# Patient Record
Sex: MALE | Race: WHITE | NOT HISPANIC OR LATINO | URBAN - METROPOLITAN AREA
[De-identification: names, ages, dates, MRNs, and addresses within clinical notes are randomized per-mention and may not be internally consistent; named-entity substitution may affect disease eponyms.]

---

## 2018-05-17 ENCOUNTER — TRANSCRIBE ORDERS (OUTPATIENT)
Dept: ADMINISTRATIVE | Facility: HOSPITAL | Age: 58
End: 2018-05-17

## 2018-05-17 DIAGNOSIS — R79.89 ELEVATED LFTS: Primary | ICD-10-CM

## 2025-01-04 ENCOUNTER — APPOINTMENT (OUTPATIENT)
Dept: GENERAL RADIOLOGY | Facility: HOSPITAL | Age: 65
DRG: 287 | End: 2025-01-04
Payer: COMMERCIAL

## 2025-01-04 ENCOUNTER — APPOINTMENT (OUTPATIENT)
Facility: HOSPITAL | Age: 65
DRG: 287 | End: 2025-01-04
Payer: COMMERCIAL

## 2025-01-04 ENCOUNTER — HOSPITAL ENCOUNTER (INPATIENT)
Facility: HOSPITAL | Age: 65
LOS: 1 days | Discharge: HOME | DRG: 287 | End: 2025-01-05
Attending: EMERGENCY MEDICINE | Admitting: INTERNAL MEDICINE
Payer: COMMERCIAL

## 2025-01-04 DIAGNOSIS — I21.4 NSTEMI (NON-ST ELEVATED MYOCARDIAL INFARCTION): Primary | ICD-10-CM

## 2025-01-04 LAB
ALBUMIN SERPL-MCNC: 4.6 G/DL (ref 3.5–5.2)
ALBUMIN/GLOB SERPL: 1.5 G/DL
ALP SERPL-CCNC: 72 U/L (ref 39–117)
ALT SERPL W P-5'-P-CCNC: 33 U/L (ref 1–41)
ANION GAP SERPL CALCULATED.3IONS-SCNC: 19.7 MMOL/L (ref 5–15)
APTT PPP: 25.2 SECONDS (ref 60–90)
APTT PPP: 35.7 SECONDS (ref 60–90)
AST SERPL-CCNC: 30 U/L (ref 1–40)
BASOPHILS # BLD AUTO: 0.02 10*3/MM3 (ref 0–0.2)
BASOPHILS NFR BLD AUTO: 0.1 % (ref 0–1.5)
BILIRUB SERPL-MCNC: 0.7 MG/DL (ref 0–1.2)
BUN SERPL-MCNC: 23 MG/DL (ref 8–23)
BUN/CREAT SERPL: 21.1 (ref 7–25)
CALCIUM SPEC-SCNC: 10.7 MG/DL (ref 8.6–10.5)
CHLORIDE SERPL-SCNC: 101 MMOL/L (ref 98–107)
CO2 SERPL-SCNC: 19.3 MMOL/L (ref 22–29)
CREAT SERPL-MCNC: 1.09 MG/DL (ref 0.76–1.27)
D-LACTATE SERPL-SCNC: 1.8 MMOL/L (ref 0.5–2)
D-LACTATE SERPL-SCNC: 2.6 MMOL/L (ref 0.5–2)
D-LACTATE SERPL-SCNC: 3.8 MMOL/L (ref 0.5–2)
DEPRECATED RDW RBC AUTO: 38.4 FL (ref 37–54)
EGFRCR SERPLBLD CKD-EPI 2021: 76.7 ML/MIN/1.73
EOSINOPHIL # BLD AUTO: 0.1 10*3/MM3 (ref 0–0.4)
EOSINOPHIL NFR BLD AUTO: 0.7 % (ref 0.3–6.2)
ERYTHROCYTE [DISTWIDTH] IN BLOOD BY AUTOMATED COUNT: 11.8 % (ref 12.3–15.4)
ETHANOL BLD-MCNC: <10 MG/DL (ref 0–10)
FLUAV RNA RESP QL NAA+PROBE: NOT DETECTED
FLUBV RNA RESP QL NAA+PROBE: NOT DETECTED
GEN 5 1HR TROPONIN T REFLEX: 56 NG/L
GLOBULIN UR ELPH-MCNC: 3 GM/DL
GLUCOSE SERPL-MCNC: 183 MG/DL (ref 65–99)
HCT VFR BLD AUTO: 37.1 % (ref 37.5–51)
HGB BLD-MCNC: 13.3 G/DL (ref 13–17.7)
HOLD SPECIMEN: NORMAL
IMM GRANULOCYTES # BLD AUTO: 0.03 10*3/MM3 (ref 0–0.05)
IMM GRANULOCYTES NFR BLD AUTO: 0.2 % (ref 0–0.5)
INR PPP: 0.93 (ref 0.89–1.12)
LIPASE SERPL-CCNC: 18 U/L (ref 13–60)
LYMPHOCYTES # BLD AUTO: 0.55 10*3/MM3 (ref 0.7–3.1)
LYMPHOCYTES NFR BLD AUTO: 3.6 % (ref 19.6–45.3)
MAGNESIUM SERPL-MCNC: 1.5 MG/DL (ref 1.6–2.4)
MCH RBC QN AUTO: 31.3 PG (ref 26.6–33)
MCHC RBC AUTO-ENTMCNC: 35.8 G/DL (ref 31.5–35.7)
MCV RBC AUTO: 87.3 FL (ref 79–97)
MONOCYTES # BLD AUTO: 0.35 10*3/MM3 (ref 0.1–0.9)
MONOCYTES NFR BLD AUTO: 2.3 % (ref 5–12)
NEUTROPHILS NFR BLD AUTO: 14.28 10*3/MM3 (ref 1.7–7)
NEUTROPHILS NFR BLD AUTO: 93.1 % (ref 42.7–76)
NT-PROBNP SERPL-MCNC: 1039 PG/ML (ref 0–900)
PLATELET # BLD AUTO: 413 10*3/MM3 (ref 140–450)
PMV BLD AUTO: 8.6 FL (ref 6–12)
POTASSIUM SERPL-SCNC: 3.7 MMOL/L (ref 3.5–5.2)
PROT SERPL-MCNC: 7.6 G/DL (ref 6–8.5)
PROTHROMBIN TIME: 13 SECONDS (ref 12.2–14.5)
RBC # BLD AUTO: 4.25 10*6/MM3 (ref 4.14–5.8)
RSV RNA RESP QL NAA+PROBE: NOT DETECTED
SARS-COV-2 RNA RESP QL NAA+PROBE: NOT DETECTED
SODIUM SERPL-SCNC: 140 MMOL/L (ref 136–145)
TROPONIN T % DELTA: 4 %
TROPONIN T NUMERIC DELTA: 2 NG/L
TROPONIN T SERPL HS-MCNC: 54 NG/L
TROPONIN T SERPL HS-MCNC: 71 NG/L
UFH PPP CHRO-ACNC: 0.1 IU/ML (ref 0.3–0.7)
WBC NRBC COR # BLD AUTO: 15.33 10*3/MM3 (ref 3.4–10.8)
WHOLE BLOOD HOLD COAG: NORMAL
WHOLE BLOOD HOLD SPECIMEN: NORMAL

## 2025-01-04 PROCEDURE — 85025 COMPLETE CBC W/AUTO DIFF WBC: CPT | Performed by: EMERGENCY MEDICINE

## 2025-01-04 PROCEDURE — 25810000003 SODIUM CHLORIDE 0.9 % SOLUTION

## 2025-01-04 PROCEDURE — 99291 CRITICAL CARE FIRST HOUR: CPT

## 2025-01-04 PROCEDURE — 93005 ELECTROCARDIOGRAM TRACING: CPT | Performed by: EMERGENCY MEDICINE

## 2025-01-04 PROCEDURE — 25010000002 HEPARIN (PORCINE) PER 1000 UNITS

## 2025-01-04 PROCEDURE — 25010000002 MAGNESIUM SULFATE 2 GM/50ML SOLUTION

## 2025-01-04 PROCEDURE — 83880 ASSAY OF NATRIURETIC PEPTIDE: CPT | Performed by: EMERGENCY MEDICINE

## 2025-01-04 PROCEDURE — 25010000002 THIAMINE HCL 200 MG/2ML SOLUTION: Performed by: HOSPITALIST

## 2025-01-04 PROCEDURE — 25510000001 IOPAMIDOL PER 1 ML: Performed by: EMERGENCY MEDICINE

## 2025-01-04 PROCEDURE — 25010000002 MORPHINE PER 10 MG: Performed by: EMERGENCY MEDICINE

## 2025-01-04 PROCEDURE — 25010000002 ONDANSETRON PER 1 MG: Performed by: EMERGENCY MEDICINE

## 2025-01-04 PROCEDURE — 85520 HEPARIN ASSAY: CPT

## 2025-01-04 PROCEDURE — 84484 ASSAY OF TROPONIN QUANT: CPT | Performed by: HOSPITALIST

## 2025-01-04 PROCEDURE — 84484 ASSAY OF TROPONIN QUANT: CPT | Performed by: EMERGENCY MEDICINE

## 2025-01-04 PROCEDURE — 85730 THROMBOPLASTIN TIME PARTIAL: CPT

## 2025-01-04 PROCEDURE — 25010000002 NITROGLYCERIN 200 MCG/ML SOLUTION

## 2025-01-04 PROCEDURE — 99223 1ST HOSP IP/OBS HIGH 75: CPT | Performed by: HOSPITALIST

## 2025-01-04 PROCEDURE — 85610 PROTHROMBIN TIME: CPT

## 2025-01-04 PROCEDURE — 36415 COLL VENOUS BLD VENIPUNCTURE: CPT

## 2025-01-04 PROCEDURE — 71275 CT ANGIOGRAPHY CHEST: CPT

## 2025-01-04 PROCEDURE — 83605 ASSAY OF LACTIC ACID: CPT

## 2025-01-04 PROCEDURE — 83735 ASSAY OF MAGNESIUM: CPT

## 2025-01-04 PROCEDURE — 25010000002 MAGNESIUM SULFATE 2 GM/50ML SOLUTION: Performed by: HOSPITALIST

## 2025-01-04 PROCEDURE — 87637 SARSCOV2&INF A&B&RSV AMP PRB: CPT

## 2025-01-04 PROCEDURE — 25010000002 HEPARIN (PORCINE) 25000-0.45 UT/250ML-% SOLUTION

## 2025-01-04 PROCEDURE — 25010000002 ONDANSETRON PER 1 MG

## 2025-01-04 PROCEDURE — 82077 ASSAY SPEC XCP UR&BREATH IA: CPT

## 2025-01-04 PROCEDURE — 83690 ASSAY OF LIPASE: CPT | Performed by: EMERGENCY MEDICINE

## 2025-01-04 PROCEDURE — 80053 COMPREHEN METABOLIC PANEL: CPT | Performed by: EMERGENCY MEDICINE

## 2025-01-04 RX ORDER — CLOPIDOGREL BISULFATE 75 MG/1
300 TABLET ORAL ONCE
Status: COMPLETED | OUTPATIENT
Start: 2025-01-04 | End: 2025-01-04

## 2025-01-04 RX ORDER — ROSUVASTATIN CALCIUM 20 MG/1
40 TABLET, COATED ORAL DAILY
Status: DISCONTINUED | OUTPATIENT
Start: 2025-01-05 | End: 2025-01-05 | Stop reason: HOSPADM

## 2025-01-04 RX ORDER — MAGNESIUM SULFATE HEPTAHYDRATE 40 MG/ML
2 INJECTION, SOLUTION INTRAVENOUS
Status: CANCELLED | OUTPATIENT
Start: 2025-01-04 | End: 2025-01-05

## 2025-01-04 RX ORDER — ASPIRIN 81 MG/1
81 TABLET ORAL DAILY
Status: DISCONTINUED | OUTPATIENT
Start: 2025-01-05 | End: 2025-01-05 | Stop reason: HOSPADM

## 2025-01-04 RX ORDER — LISINOPRIL 30 MG/1
30 TABLET ORAL DAILY
COMMUNITY

## 2025-01-04 RX ORDER — NITROGLYCERIN 0.4 MG/1
0.4 TABLET SUBLINGUAL
Status: DISCONTINUED | OUTPATIENT
Start: 2025-01-04 | End: 2025-01-05 | Stop reason: HOSPADM

## 2025-01-04 RX ORDER — SODIUM CHLORIDE 9 MG/ML
40 INJECTION, SOLUTION INTRAVENOUS AS NEEDED
Status: DISCONTINUED | OUTPATIENT
Start: 2025-01-04 | End: 2025-01-05 | Stop reason: HOSPADM

## 2025-01-04 RX ORDER — POLYETHYLENE GLYCOL 3350 17 G/17G
17 POWDER, FOR SOLUTION ORAL DAILY PRN
Status: DISCONTINUED | OUTPATIENT
Start: 2025-01-04 | End: 2025-01-05 | Stop reason: HOSPADM

## 2025-01-04 RX ORDER — HEPARIN SODIUM 1000 [USP'U]/ML
4000 INJECTION, SOLUTION INTRAVENOUS; SUBCUTANEOUS AS NEEDED
Status: DISCONTINUED | OUTPATIENT
Start: 2025-01-04 | End: 2025-01-04

## 2025-01-04 RX ORDER — DIAZEPAM 5 MG/1
20 TABLET ORAL
Status: DISCONTINUED | OUTPATIENT
Start: 2025-01-04 | End: 2025-01-05 | Stop reason: HOSPADM

## 2025-01-04 RX ORDER — AMOXICILLIN 250 MG
2 CAPSULE ORAL 2 TIMES DAILY PRN
Status: DISCONTINUED | OUTPATIENT
Start: 2025-01-04 | End: 2025-01-05 | Stop reason: HOSPADM

## 2025-01-04 RX ORDER — ONDANSETRON 2 MG/ML
4 INJECTION INTRAMUSCULAR; INTRAVENOUS ONCE
Status: DISCONTINUED | OUTPATIENT
Start: 2025-01-04 | End: 2025-01-04

## 2025-01-04 RX ORDER — MAGNESIUM SULFATE HEPTAHYDRATE 40 MG/ML
2 INJECTION, SOLUTION INTRAVENOUS ONCE
Status: COMPLETED | OUTPATIENT
Start: 2025-01-04 | End: 2025-01-04

## 2025-01-04 RX ORDER — DIAZEPAM 10 MG/2ML
10 INJECTION, SOLUTION INTRAMUSCULAR; INTRAVENOUS
Status: DISCONTINUED | OUTPATIENT
Start: 2025-01-04 | End: 2025-01-05 | Stop reason: HOSPADM

## 2025-01-04 RX ORDER — ACETAMINOPHEN 325 MG/1
650 TABLET ORAL EVERY 4 HOURS PRN
Status: DISCONTINUED | OUTPATIENT
Start: 2025-01-04 | End: 2025-01-05 | Stop reason: HOSPADM

## 2025-01-04 RX ORDER — DIAZEPAM 5 MG/1
15 TABLET ORAL
Status: DISCONTINUED | OUTPATIENT
Start: 2025-01-04 | End: 2025-01-05 | Stop reason: HOSPADM

## 2025-01-04 RX ORDER — METOPROLOL TARTRATE 25 MG/1
25 TABLET, FILM COATED ORAL ONCE
Status: COMPLETED | OUTPATIENT
Start: 2025-01-04 | End: 2025-01-04

## 2025-01-04 RX ORDER — IOPAMIDOL 755 MG/ML
100 INJECTION, SOLUTION INTRAVASCULAR
Status: COMPLETED | OUTPATIENT
Start: 2025-01-04 | End: 2025-01-04

## 2025-01-04 RX ORDER — MORPHINE SULFATE 2 MG/ML
2 INJECTION, SOLUTION INTRAMUSCULAR; INTRAVENOUS EVERY 4 HOURS PRN
Status: DISCONTINUED | OUTPATIENT
Start: 2025-01-04 | End: 2025-01-05

## 2025-01-04 RX ORDER — HEPARIN SODIUM 1000 [USP'U]/ML
4000 INJECTION, SOLUTION INTRAVENOUS; SUBCUTANEOUS ONCE
Status: COMPLETED | OUTPATIENT
Start: 2025-01-04 | End: 2025-01-04

## 2025-01-04 RX ORDER — DIAZEPAM 10 MG/2ML
15 INJECTION, SOLUTION INTRAMUSCULAR; INTRAVENOUS
Status: DISCONTINUED | OUTPATIENT
Start: 2025-01-04 | End: 2025-01-05 | Stop reason: HOSPADM

## 2025-01-04 RX ORDER — METOPROLOL TARTRATE 1 MG/ML
5 INJECTION, SOLUTION INTRAVENOUS ONCE
Status: COMPLETED | OUTPATIENT
Start: 2025-01-04 | End: 2025-01-04

## 2025-01-04 RX ORDER — HEPARIN SODIUM 1000 [USP'U]/ML
2000 INJECTION, SOLUTION INTRAVENOUS; SUBCUTANEOUS AS NEEDED
Status: DISCONTINUED | OUTPATIENT
Start: 2025-01-04 | End: 2025-01-04

## 2025-01-04 RX ORDER — CLOPIDOGREL BISULFATE 75 MG/1
75 TABLET ORAL DAILY
Status: DISCONTINUED | OUTPATIENT
Start: 2025-01-05 | End: 2025-01-05 | Stop reason: HOSPADM

## 2025-01-04 RX ORDER — FOLIC ACID 1 MG/1
1 TABLET ORAL DAILY
Status: DISCONTINUED | OUTPATIENT
Start: 2025-01-04 | End: 2025-01-05 | Stop reason: HOSPADM

## 2025-01-04 RX ORDER — ASPIRIN 81 MG/1
324 TABLET, CHEWABLE ORAL ONCE
Status: COMPLETED | OUTPATIENT
Start: 2025-01-04 | End: 2025-01-04

## 2025-01-04 RX ORDER — ONDANSETRON 2 MG/ML
4 INJECTION INTRAMUSCULAR; INTRAVENOUS ONCE
Status: COMPLETED | OUTPATIENT
Start: 2025-01-04 | End: 2025-01-04

## 2025-01-04 RX ORDER — PANTOPRAZOLE SODIUM 40 MG/10ML
80 INJECTION, POWDER, LYOPHILIZED, FOR SOLUTION INTRAVENOUS ONCE
Status: COMPLETED | OUTPATIENT
Start: 2025-01-04 | End: 2025-01-04

## 2025-01-04 RX ORDER — DIAZEPAM 5 MG/1
10 TABLET ORAL
Status: DISCONTINUED | OUTPATIENT
Start: 2025-01-04 | End: 2025-01-05 | Stop reason: HOSPADM

## 2025-01-04 RX ORDER — MULTIPLE VITAMINS W/ MINERALS TAB 9MG-400MCG
1 TAB ORAL DAILY
Status: DISCONTINUED | OUTPATIENT
Start: 2025-01-04 | End: 2025-01-05 | Stop reason: HOSPADM

## 2025-01-04 RX ORDER — NITROGLYCERIN 20 MG/100ML
5-200 INJECTION INTRAVENOUS
Status: DISCONTINUED | OUTPATIENT
Start: 2025-01-04 | End: 2025-01-05

## 2025-01-04 RX ORDER — THIAMINE HYDROCHLORIDE 100 MG/ML
200 INJECTION, SOLUTION INTRAMUSCULAR; INTRAVENOUS EVERY 8 HOURS SCHEDULED
Status: DISCONTINUED | OUTPATIENT
Start: 2025-01-04 | End: 2025-01-05 | Stop reason: HOSPADM

## 2025-01-04 RX ORDER — BISACODYL 10 MG
10 SUPPOSITORY, RECTAL RECTAL DAILY PRN
Status: DISCONTINUED | OUTPATIENT
Start: 2025-01-04 | End: 2025-01-05 | Stop reason: HOSPADM

## 2025-01-04 RX ORDER — ROSUVASTATIN CALCIUM 40 MG/1
40 TABLET, COATED ORAL DAILY
COMMUNITY

## 2025-01-04 RX ORDER — BISACODYL 5 MG/1
5 TABLET, DELAYED RELEASE ORAL DAILY PRN
Status: DISCONTINUED | OUTPATIENT
Start: 2025-01-04 | End: 2025-01-05 | Stop reason: HOSPADM

## 2025-01-04 RX ORDER — HEPARIN SODIUM 10000 [USP'U]/100ML
13 INJECTION, SOLUTION INTRAVENOUS
Status: DISCONTINUED | OUTPATIENT
Start: 2025-01-04 | End: 2025-01-05 | Stop reason: HOSPADM

## 2025-01-04 RX ORDER — SODIUM CHLORIDE 0.9 % (FLUSH) 0.9 %
10 SYRINGE (ML) INJECTION AS NEEDED
Status: DISCONTINUED | OUTPATIENT
Start: 2025-01-04 | End: 2025-01-05 | Stop reason: HOSPADM

## 2025-01-04 RX ORDER — DIAZEPAM 10 MG/2ML
20 INJECTION, SOLUTION INTRAMUSCULAR; INTRAVENOUS
Status: DISCONTINUED | OUTPATIENT
Start: 2025-01-04 | End: 2025-01-05 | Stop reason: HOSPADM

## 2025-01-04 RX ORDER — MAGNESIUM SULFATE HEPTAHYDRATE 40 MG/ML
2 INJECTION, SOLUTION INTRAVENOUS
Status: DISPENSED | OUTPATIENT
Start: 2025-01-04 | End: 2025-01-05

## 2025-01-04 RX ORDER — SODIUM CHLORIDE 0.9 % (FLUSH) 0.9 %
10 SYRINGE (ML) INJECTION EVERY 12 HOURS SCHEDULED
Status: DISCONTINUED | OUTPATIENT
Start: 2025-01-04 | End: 2025-01-05 | Stop reason: HOSPADM

## 2025-01-04 RX ADMIN — NITROGLYCERIN 0.4 MG: 0.4 TABLET SUBLINGUAL at 14:36

## 2025-01-04 RX ADMIN — MAGNESIUM SULFATE HEPTAHYDRATE 2 G: 40 INJECTION, SOLUTION INTRAVENOUS at 15:41

## 2025-01-04 RX ADMIN — IOPAMIDOL 64 ML: 755 INJECTION, SOLUTION INTRAVENOUS at 14:49

## 2025-01-04 RX ADMIN — FOLIC ACID 1 MG: 1 TABLET ORAL at 21:38

## 2025-01-04 RX ADMIN — METOPROLOL TARTRATE 25 MG: 25 TABLET, FILM COATED ORAL at 16:18

## 2025-01-04 RX ADMIN — MORPHINE SULFATE 4 MG: 4 INJECTION, SOLUTION INTRAMUSCULAR; INTRAVENOUS at 15:53

## 2025-01-04 RX ADMIN — HEPARIN SODIUM 10 UNITS/KG/HR: 10000 INJECTION, SOLUTION INTRAVENOUS at 15:28

## 2025-01-04 RX ADMIN — IOPAMIDOL 85 ML: 755 INJECTION, SOLUTION INTRAVENOUS at 14:53

## 2025-01-04 RX ADMIN — NITROGLYCERIN 5 MCG/MIN: 20 INJECTION INTRAVENOUS at 19:00

## 2025-01-04 RX ADMIN — THIAMINE HYDROCHLORIDE 200 MG: 100 INJECTION, SOLUTION INTRAMUSCULAR; INTRAVENOUS at 21:39

## 2025-01-04 RX ADMIN — SODIUM CHLORIDE 500 ML: 9 INJECTION, SOLUTION INTRAVENOUS at 15:41

## 2025-01-04 RX ADMIN — NITROGLYCERIN 0.4 MG: 0.4 TABLET SUBLINGUAL at 14:30

## 2025-01-04 RX ADMIN — ONDANSETRON 4 MG: 2 INJECTION INTRAMUSCULAR; INTRAVENOUS at 15:55

## 2025-01-04 RX ADMIN — HEPARIN SODIUM 4000 UNITS: 1000 INJECTION INTRAVENOUS; SUBCUTANEOUS at 15:28

## 2025-01-04 RX ADMIN — ASPIRIN 81 MG 324 MG: 81 TABLET ORAL at 14:18

## 2025-01-04 RX ADMIN — NITROGLYCERIN 10 MCG/MIN: 20 INJECTION INTRAVENOUS at 15:21

## 2025-01-04 RX ADMIN — CLOPIDOGREL BISULFATE 300 MG: 75 TABLET ORAL at 16:18

## 2025-01-04 RX ADMIN — PANTOPRAZOLE SODIUM 80 MG: 40 INJECTION, POWDER, FOR SOLUTION INTRAVENOUS at 16:13

## 2025-01-04 RX ADMIN — METOPROLOL TARTRATE 5 MG: 5 INJECTION INTRAVENOUS at 16:15

## 2025-01-04 RX ADMIN — MORPHINE SULFATE 4 MG: 4 INJECTION, SOLUTION INTRAMUSCULAR; INTRAVENOUS at 17:26

## 2025-01-04 RX ADMIN — Medication 10 ML: at 21:39

## 2025-01-04 RX ADMIN — Medication 1 TABLET: at 21:39

## 2025-01-04 RX ADMIN — ONDANSETRON 4 MG: 2 INJECTION INTRAMUSCULAR; INTRAVENOUS at 18:08

## 2025-01-04 RX ADMIN — MAGNESIUM SULFATE HEPTAHYDRATE 2 G: 40 INJECTION, SOLUTION INTRAVENOUS at 23:00

## 2025-01-04 RX ADMIN — HEPARIN SODIUM 4000 UNITS: 1000 INJECTION INTRAVENOUS; SUBCUTANEOUS at 22:26

## 2025-01-04 RX ADMIN — NITROGLYCERIN 0.4 MG: 0.4 TABLET SUBLINGUAL at 14:43

## 2025-01-04 NOTE — Clinical Note
Level of Care: Telemetry [5]   Accepting Provider:: BHAVIN CARDOZO [[id number]]   Patient Class: Inpatient [101]

## 2025-01-04 NOTE — FSED PROVIDER NOTE
"CHIEF COMPLAINT  Chest Pain     HISTORY OF PRESENT ILLNESS:  Dany Rice is a 64 y.o. male who presents with midsternal chest pain/pressure that he describes as sharp pressure.  He has a history of hypertension and hypercholesterolemia.  Also reports that he had some indigestion around midnight last night and took some Tums and went to sleep.  Around 3 AM he woke up with nausea and vomiting x 3 with maybe 2 or 3 loose bowel movements.  This continued till 7 AM when the chest pain began.  He also reports that he drinks 2-3 beers per day as well as 2 shots of bourbon.  Last drink was at 9 PM last night.  He does not smoke tobacco products but does smoke marijuana occasionally.  No other illicit drug uses.  Family history of cardiac disease.  Has never had a cardiac workup and does not have a cardiologist      PAST MEDICAL HISTORY  Past Medical History:   Diagnosis Date    Hyperlipidemia     Hypertension      Reviewed the imported nursing notes    PAST SURGICAL HISTORY  History reviewed. No pertinent surgical history.  Reviewed the imported nursing notes    ALLERGIES  Allergies   Allergen Reactions    Penicillins Nausea Only       MEDICATIONS       Medication List        ASK your doctor about these medications      HYDROcodone-acetaminophen 5-325 MG per tablet  Commonly known as: NORCO  Take 1 tablet by mouth Every 6 (Six) Hours as Needed for Pain            SOCIAL HISTORY       The patient otherwise denies any further social history.  Reviewed the imported nursing notes      FAMILY HISTORY  History reviewed. No pertinent family history.  The patient otherwise patient denies any further family history.  Reviewed the imported nursing notes      REVIEW OF SYSTEMS  Reviewed and negative/not pertinent unless mentioned in the HPI        PHYSICAL EXAM  Vitals: /80   Pulse 97   Temp 99.6 °F (37.6 °C) (Axillary)   Resp 24   Ht 182.9 cm (72\")   Wt 97.5 kg (215 lb)   SpO2 100%   BMI 29.16 kg/m²      General " Appearance: Awake and Alert, cooperative, in mild distress and flushed   Head:  Normocephalic, atraumatic   Eyes: Conjunctiva clear, corneas clear   Ears:  Normal external ears,   Nose: Nares normal and clear   Throat: Lips, mucosa moist   Neck:  Trachea midline, no stridor   Lungs:  Tachypneic, clear to auscultation bilaterally, respirations unlabored   Heart: Regular, No rub   Abdomen: Nondistended, non tender   Genitourinary:  Pelvic:  Rectal: Not Performed  Not Performed  Not Performed   Extremities: Extremities Atraumatic   Vascular: Present in all extremities   Skin:  no rashes or lesions   Neurologic: Non Focal , CN 2-12 grossly intact, GCS 15   Psyc: Not Performed         MEDICAL DECISION MAKING - ED COURSE/PROCEDURE/DDX:    PULSE OX: Interpretation by me on room air Is normal  SpO2 Readings from Last 1 Encounters:   01/04/25 100%          CARDIAC MONITOR: Sinus tachycardia  Pulse Readings from Last 1 Encounters:   01/04/25 97            I reviewed the nursing notes: YES  I reviewed and discussed with EMS:   External documents reviewed:   Additional history taken from: Previous notes     Discussed with consulting physician hospitalist and cardiology additionally, the admitting / accepting provider was contacted with handoff      LAB REVIEWED: Interpretation of all unique test ordered  Labs Reviewed   TROPONIN - Abnormal; Notable for the following components:       Result Value    HS Troponin T 54 (*)     All other components within normal limits   COMPREHENSIVE METABOLIC PANEL - Abnormal; Notable for the following components:    Glucose 183 (*)     CO2 19.3 (*)     Calcium 10.7 (*)     Anion Gap 19.7 (*)     All other components within normal limits    Narrative:     GFR Categories in Chronic Kidney Disease (CKD)      GFR Category          GFR (mL/min/1.73)    Interpretation  G1                     90 or greater         Normal or high (1)  G2                      60-89                Mild decrease (1)  G3a                    45-59                Mild to moderate decrease  G3b                   30-44                Moderate to severe decrease  G4                    15-29                Severe decrease  G5                    14 or less           Kidney failure          (1)In the absence of evidence of kidney disease, neither GFR category G1 or G2 fulfill the criteria for CKD.    eGFR calculation 2021 CKD-EPI creatinine equation, which does not include race as a factor   BNP (IN-HOUSE) - Abnormal; Notable for the following components:    proBNP 1,039.0 (*)     All other components within normal limits    Narrative:     This assay is used as an aid in the diagnosis of individuals suspected of having heart failure. It can be used as an aid in the diagnosis of acute decompensated heart failure (ADHF) in patients presenting with signs and symptoms of ADHF to the emergency department (ED). In addition, NT-proBNP of <300 pg/mL indicates ADHF is not likely.    Age Range Result Interpretation  NT-proBNP Concentration (pg/mL:      <50             Positive            >450                   Gray                 300-450                    Negative             <300    50-75           Positive            >900                  Gray                300-900                  Negative            <300      >75             Positive            >1800                  Gray                300-1800                  Negative            <300   CBC WITH AUTO DIFFERENTIAL - Abnormal; Notable for the following components:    WBC 15.33 (*)     Hematocrit 37.1 (*)     MCHC 35.8 (*)     RDW 11.8 (*)     Neutrophil % 93.1 (*)     Lymphocyte % 3.6 (*)     Monocyte % 2.3 (*)     Neutrophils, Absolute 14.28 (*)     Lymphocytes, Absolute 0.55 (*)     All other components within normal limits   LACTIC ACID, PLASMA - Abnormal; Notable for the following components:    Lactate 3.8 (*)     All other components within normal limits   MAGNESIUM - Abnormal; Notable for  the following components:    Magnesium 1.5 (*)     All other components within normal limits   HIGH SENSITIVITIY TROPONIN T 1HR - Abnormal; Notable for the following components:    HS Troponin T 56 (*)     All other components within normal limits   APTT - Abnormal; Notable for the following components:    PTT 25.2 (*)     All other components within normal limits    Narrative:     PTT = The equivalent PTT values for the therapeutic range of heparin levels at 0.3 to 0.5 U/ml are 60 to 70 seconds.   COVID-19/FLUA&B/RSV, NP SWAB IN TRANSPORT MEDIA 1 HR TAT - Normal    Narrative:     Fact sheet for providers: https://www.fda.gov/media/061451/download    Fact sheet for patients: https://www.fda.gov/media/019306/download    Test performed by PCR.   LIPASE - Normal   ETHANOL - Normal    Narrative:     Elevated lactic acid concentration and lactate dehydrogenase(LD) activity may falsely elevate enzymatically determined ethanol levels. Not for legal purposes.    PROTIME-INR - Normal   RAINBOW DRAW    Narrative:     The following orders were created for panel order [place] Draw.  Procedure                               Abnormality         Status                     ---------                               -----------         ------                     Green Top (Gel)[[id number]]                                  Final result               Lavender Top[[id number]]                                     Final result               Gold Top - SST[[id number]]                                   Final result               Hubbard Top[[id number]]                                         Final result               Light Blue Top[[id number]]                                   Final result                 Please view results for these tests on the individual orders.   LACTIC ACID, REFLEX   HEPARIN ANTI XA   APTT   CBC AND DIFFERENTIAL    Narrative:     The following orders were created for panel order CBC & Differential.  Procedure                                Abnormality         Status                     ---------                               -----------         ------                     CBC Auto Differential[[id number]]        Abnormal            Final result                 Please view results for these tests on the individual orders.   GREEN TOP   LAVENDER TOP   GOLD TOP - SST   GRAY TOP   LIGHT BLUE TOP       IMAGING REVIEWED  My X-ray interpretation:   My CT interpretation: No evidence of a saddle pulmonary emboli or gross dissection  My Ultrasound interpretation:   CT Angiogram Chest Pulmonary Embolism   Final Result   Impression:   No evidence of pulmonary embolus.            Electronically Signed: Abel Ramos MD     1/4/2025 3:00 PM EST     Workstation ID: [id number]          Procedures:    Decision rules/scores:    HEART Score: 7        Drug therapy provided in the ED and monitored as needed given IV/PO :   Medications   sodium chloride 0.9 % flush 10 mL (has no administration in time range)   nitroglycerin (NITROSTAT) SL tablet 0.4 mg (0.4 mg Sublingual Given 1/4/25 1443)   nitroglycerin (TRIDIL) 200 mcg/ml infusion (40 mcg/min Intravenous Rate/Dose Change 1/4/25 1619)   heparin 89560 units/250 mL (100 units/mL) in 0.45 % NaCl infusion (10 Units/kg/hr × 97.5 kg Intravenous New Bag 1/4/25 1528)   Pharmacy to Dose Heparin (has no administration in time range)   magnesium sulfate 2g/50 mL (PREMIX) infusion (2 g Intravenous New Bag 1/4/25 1541)   aspirin chewable tablet 324 mg (324 mg Oral Given 1/4/25 1418)   iopamidol (ISOVUE-370) 76 % injection 100 mL (64 mL Intravenous Given 1/4/25 1449)   iopamidol (ISOVUE-370) 76 % injection 100 mL (85 mL Intravenous Given 1/4/25 1453)   heparin (porcine) injection 4,000 Units (4,000 Units Intravenous Given 1/4/25 1528)   ondansetron (ZOFRAN) injection 4 mg (4 mg Intravenous Given 1/4/25 1555)   sodium chloride 0.9 % bolus 500 mL (0 mL Intravenous Stopped 1/4/25 1620)   clopidogrel (PLAVIX) tablet 300 mg (300  mg Oral Given 1/4/25 1618)   morphine injection 4 mg (4 mg Intravenous Given 1/4/25 1553)   metoprolol tartrate (LOPRESSOR) injection 5 mg (5 mg Intravenous Given 1/4/25 1615)   metoprolol tartrate (LOPRESSOR) tablet 25 mg (25 mg Oral Given 1/4/25 1618)   pantoprazole (PROTONIX) injection 80 mg (80 mg Intravenous Given 1/4/25 1613)       Vitals Trend:  Vitals:    01/04/25 1534 01/04/25 1540 01/04/25 1544 01/04/25 1615   BP:  146/80 144/69 144/80   BP Location:       Patient Position:       Pulse:  110 111 97   Resp:       Temp: 99.6 °F (37.6 °C)      TempSrc: Axillary      SpO2:  100%     Weight:       Height:           Dany Rice is a 64 y.o. male who presents to the emergency department with the acute illness as stated within HPI  Shared medical decision making regarding a detailed discussion with the patient concerning this ED encounter, the differential diagnosis considered acute coronary syndrome, pulmonary emboli, dissection, GERD, ulcer, alcohol withdrawal, Prinzmetal's angina,, and the emergency room imaging and lab testing done today The patient and/or family have been given an opportunity to ask questions and exhibit an understanding of what happened today in the emergency room.        ED Course as of 01/04/25 1637   Sat Jan 04, 2025   1511 High Sensitivity Troponin T(!!) [NC]   1516 Patient appears to be having an NSTEMI.  His chest pain is improved from a 6 or 7 out of 10 down to a 5 out of 10 with the 3 sublingual nitroglycerin.  Will initiate a nitro drip and a heparin drip.  Consulting PCC for admission and cardiology input. [NC]   1517 Magnesium(!)  Will replete with 2 g IV [NC]   1517 Lactic Acid, Plasma(!!)  Fluids ordered [NC]   1546 Spoke with cardiology on-call, Dr. Davis,  agrees the patient's elderly he is having an NSTEMI and agrees to accept the patient for admission and transfer to [facility].  He is requesting a loading dose of Plavix and to administer metoprolol both IV and p.o.   Agrees with the heparin drip and nitro drip. [NC]      ED Course User Index  [NC] Júnior Curtis PA-C             Critical care time  45 minutes of critical care provided. This time excludes other billable procedures. Time does include preparation of documents, medical consultations, review of old records, and direct bedside care. Patient is at high risk for life-threatening deterioration due to acute coronary syndrome indicating emergent consultation with cardiology and placement on a nitroglycerin drip, heparin drip and frequent reevaluations and clinical monitoring of vital signs and patient's clinical status.             Condition considered emergent due to:  1) Acuity  2) Failure or unavailable treatment in the outpatient setting  3) Risk of deterioration and decompensation given the multiple differential diagnoses that  need to be ruled out      Amount and/or Complexity of Data Reviewed  Clinical lab tests: as above noted in MDM  Tests in the radiology section of CPT®: as above noted in MDM  Tests in the medicine section of CPT®: as above noted in MDM  Independent visualization of images, tracings, or specimens: as above noted in MDM        CLINICAL IMPRESSION:  Final diagnoses:   NSTEMI (non-ST elevated myocardial infarction)      DISPOSITION:  Admit      As with my usual standard practice patient was advised to return for any reason for any  complaint at any time whatsoever. Please refer to the discharge instructions, AVS paperwork  and prescriptions written for treatment plan.        Electronically signed by Júnior Curtis PA-C, 01/04/25, 2:10 PM EST.      This report was dictated utilizing a voice recognition system which has a propensity to miss  small words such as a, an, the, no, he,she,him, her,his and not. Please read this report carefully,  and if any discrepancy or uncertainty is present, please contact the author directly for  clarification

## 2025-01-05 ENCOUNTER — READMISSION MANAGEMENT (OUTPATIENT)
Dept: CALL CENTER | Facility: HOSPITAL | Age: 65
End: 2025-01-05
Payer: COMMERCIAL

## 2025-01-05 ENCOUNTER — APPOINTMENT (OUTPATIENT)
Dept: CARDIOLOGY | Facility: HOSPITAL | Age: 65
DRG: 287 | End: 2025-01-05
Payer: COMMERCIAL

## 2025-01-05 VITALS
SYSTOLIC BLOOD PRESSURE: 129 MMHG | HEIGHT: 72 IN | DIASTOLIC BLOOD PRESSURE: 76 MMHG | TEMPERATURE: 98.3 F | WEIGHT: 205.03 LBS | OXYGEN SATURATION: 96 % | RESPIRATION RATE: 16 BRPM | BODY MASS INDEX: 27.77 KG/M2 | HEART RATE: 91 BPM

## 2025-01-05 PROBLEM — F10.90 ALCOHOL USE: Status: ACTIVE | Noted: 2025-01-05

## 2025-01-05 PROBLEM — Z78.9 ALCOHOL USE: Status: ACTIVE | Noted: 2025-01-05

## 2025-01-05 PROBLEM — I10 ESSENTIAL HYPERTENSION: Status: ACTIVE | Noted: 2025-01-05

## 2025-01-05 PROBLEM — E78.5 HYPERLIPIDEMIA: Status: ACTIVE | Noted: 2025-01-05

## 2025-01-05 LAB
ANION GAP SERPL CALCULATED.3IONS-SCNC: 14 MMOL/L (ref 5–15)
AV MEAN PRESS GRAD SYS DOP V1V2: 7 MMHG
AV VMAX SYS DOP: 179 CM/SEC
BASOPHILS # BLD AUTO: 0.04 10*3/MM3 (ref 0–0.2)
BASOPHILS NFR BLD AUTO: 0.3 % (ref 0–1.5)
BH CV ECHO MEAS - AO MAX PG: 12.8 MMHG
BH CV ECHO MEAS - AO ROOT DIAM: 3.4 CM
BH CV ECHO MEAS - AO V2 VTI: 34.8 CM
BH CV ECHO MEAS - AVA(I,D): 2.8 CM2
BH CV ECHO MEAS - EDV(CUBED): 79.5 ML
BH CV ECHO MEAS - EDV(MOD-SP2): 63.9 ML
BH CV ECHO MEAS - EDV(MOD-SP4): 63.4 ML
BH CV ECHO MEAS - EF(MOD-SP2): 65.9 %
BH CV ECHO MEAS - EF(MOD-SP4): 63.4 %
BH CV ECHO MEAS - ESV(CUBED): 22 ML
BH CV ECHO MEAS - ESV(MOD-SP2): 21.8 ML
BH CV ECHO MEAS - ESV(MOD-SP4): 23.2 ML
BH CV ECHO MEAS - FS: 34.9 %
BH CV ECHO MEAS - IVS/LVPW: 0.9 CM
BH CV ECHO MEAS - IVSD: 0.9 CM
BH CV ECHO MEAS - LA DIMENSION: 3.8 CM
BH CV ECHO MEAS - LAT PEAK E' VEL: 9.6 CM/SEC
BH CV ECHO MEAS - LV DIASTOLIC VOL/BSA (35-75): 29.4 CM2
BH CV ECHO MEAS - LV MASS(C)D: 132.7 GRAMS
BH CV ECHO MEAS - LV MAX PG: 8.4 MMHG
BH CV ECHO MEAS - LV MEAN PG: 4.5 MMHG
BH CV ECHO MEAS - LV SYSTOLIC VOL/BSA (12-30): 10.8 CM2
BH CV ECHO MEAS - LV V1 MAX: 145 CM/SEC
BH CV ECHO MEAS - LV V1 VTI: 28.6 CM
BH CV ECHO MEAS - LVIDD: 4.3 CM
BH CV ECHO MEAS - LVIDS: 2.8 CM
BH CV ECHO MEAS - LVOT AREA: 3.5 CM2
BH CV ECHO MEAS - LVOT DIAM: 2.1 CM
BH CV ECHO MEAS - LVPWD: 1 CM
BH CV ECHO MEAS - MED PEAK E' VEL: 7 CM/SEC
BH CV ECHO MEAS - MV A MAX VEL: 84.4 CM/SEC
BH CV ECHO MEAS - MV DEC SLOPE: 490 CM/SEC2
BH CV ECHO MEAS - MV DEC TIME: 0.22 SEC
BH CV ECHO MEAS - MV E MAX VEL: 105 CM/SEC
BH CV ECHO MEAS - MV E/A: 1.24
BH CV ECHO MEAS - MV MAX PG: 8.3 MMHG
BH CV ECHO MEAS - MV MEAN PG: 3 MMHG
BH CV ECHO MEAS - MV P1/2T: 79.5 MSEC
BH CV ECHO MEAS - MV V2 VTI: 33.7 CM
BH CV ECHO MEAS - MVA(P1/2T): 2.8 CM2
BH CV ECHO MEAS - MVA(VTI): 2.9 CM2
BH CV ECHO MEAS - PA ACC TIME: 0.14 SEC
BH CV ECHO MEAS - SV(LVOT): 99.1 ML
BH CV ECHO MEAS - SV(MOD-SP2): 42.1 ML
BH CV ECHO MEAS - SV(MOD-SP4): 40.2 ML
BH CV ECHO MEAS - SVI(LVOT): 46 ML/M2
BH CV ECHO MEAS - SVI(MOD-SP2): 19.6 ML/M2
BH CV ECHO MEAS - SVI(MOD-SP4): 18.7 ML/M2
BH CV ECHO MEAS - TAPSE (>1.6): 3 CM
BH CV ECHO MEAS - TR MAX PG: 26.3 MMHG
BH CV ECHO MEAS - TR MAX VEL: 255 CM/SEC
BH CV ECHO MEASUREMENTS AVERAGE E/E' RATIO: 12.65
BH CV VAS BP RIGHT ARM: NORMAL MMHG
BH CV XLRA - RV BASE: 3.3 CM
BH CV XLRA - RV LENGTH: 6.5 CM
BH CV XLRA - RV MID: 3 CM
BH CV XLRA - TDI S': 11.9 CM/SEC
BUN SERPL-MCNC: 21 MG/DL (ref 8–23)
BUN/CREAT SERPL: 21.9 (ref 7–25)
CALCIUM SPEC-SCNC: 9.4 MG/DL (ref 8.6–10.5)
CHLORIDE SERPL-SCNC: 104 MMOL/L (ref 98–107)
CHOLEST SERPL-MCNC: 124 MG/DL (ref 0–200)
CO2 SERPL-SCNC: 22 MMOL/L (ref 22–29)
CREAT SERPL-MCNC: 0.96 MG/DL (ref 0.76–1.27)
DEPRECATED RDW RBC AUTO: 43.5 FL (ref 37–54)
EGFRCR SERPLBLD CKD-EPI 2021: 89.4 ML/MIN/1.73
EOSINOPHIL # BLD AUTO: 0 10*3/MM3 (ref 0–0.4)
EOSINOPHIL NFR BLD AUTO: 0 % (ref 0.3–6.2)
ERYTHROCYTE [DISTWIDTH] IN BLOOD BY AUTOMATED COUNT: 12.7 % (ref 12.3–15.4)
GLUCOSE SERPL-MCNC: 102 MG/DL (ref 65–99)
HBA1C MFR BLD: 5.5 % (ref 4.8–5.6)
HCT VFR BLD AUTO: 37 % (ref 37.5–51)
HDLC SERPL-MCNC: 61 MG/DL (ref 40–60)
HGB BLD-MCNC: 12.5 G/DL (ref 13–17.7)
IMM GRANULOCYTES # BLD AUTO: 0.07 10*3/MM3 (ref 0–0.05)
IMM GRANULOCYTES NFR BLD AUTO: 0.5 % (ref 0–0.5)
IVRT: 88 MS
LDLC SERPL CALC-MCNC: 47 MG/DL (ref 0–100)
LDLC/HDLC SERPL: 0.75 {RATIO}
LEFT ATRIUM VOLUME INDEX: 21.2 ML/M2
LV EF BIPLANE MOD: 63.1 %
LYMPHOCYTES # BLD AUTO: 1.91 10*3/MM3 (ref 0.7–3.1)
LYMPHOCYTES NFR BLD AUTO: 13.3 % (ref 19.6–45.3)
MAGNESIUM SERPL-MCNC: 2.7 MG/DL (ref 1.6–2.4)
MCH RBC QN AUTO: 31.4 PG (ref 26.6–33)
MCHC RBC AUTO-ENTMCNC: 33.8 G/DL (ref 31.5–35.7)
MCV RBC AUTO: 93 FL (ref 79–97)
MONOCYTES # BLD AUTO: 1.26 10*3/MM3 (ref 0.1–0.9)
MONOCYTES NFR BLD AUTO: 8.8 % (ref 5–12)
NEUTROPHILS NFR BLD AUTO: 11.04 10*3/MM3 (ref 1.7–7)
NEUTROPHILS NFR BLD AUTO: 77.1 % (ref 42.7–76)
NRBC BLD AUTO-RTO: 0 /100 WBC (ref 0–0.2)
PLATELET # BLD AUTO: 342 10*3/MM3 (ref 140–450)
PMV BLD AUTO: 8.6 FL (ref 6–12)
POTASSIUM SERPL-SCNC: 3.6 MMOL/L (ref 3.5–5.2)
QT INTERVAL: 384 MS
QTC INTERVAL: 448 MS
RBC # BLD AUTO: 3.98 10*6/MM3 (ref 4.14–5.8)
SODIUM SERPL-SCNC: 140 MMOL/L (ref 136–145)
TRIGL SERPL-MCNC: 85 MG/DL (ref 0–150)
TROPONIN T SERPL HS-MCNC: 58 NG/L
UFH PPP CHRO-ACNC: 0.34 IU/ML (ref 0.3–0.7)
VLDLC SERPL-MCNC: 16 MG/DL (ref 5–40)
WBC NRBC COR # BLD AUTO: 14.32 10*3/MM3 (ref 3.4–10.8)

## 2025-01-05 PROCEDURE — 93005 ELECTROCARDIOGRAM TRACING: CPT | Performed by: HOSPITALIST

## 2025-01-05 PROCEDURE — C1894 INTRO/SHEATH, NON-LASER: HCPCS | Performed by: INTERNAL MEDICINE

## 2025-01-05 PROCEDURE — 4A023N7 MEASUREMENT OF CARDIAC SAMPLING AND PRESSURE, LEFT HEART, PERCUTANEOUS APPROACH: ICD-10-PCS | Performed by: INTERNAL MEDICINE

## 2025-01-05 PROCEDURE — 93306 TTE W/DOPPLER COMPLETE: CPT | Performed by: INTERNAL MEDICINE

## 2025-01-05 PROCEDURE — 25010000002 NICARDIPINE 2.5 MG/ML SOLUTION: Performed by: INTERNAL MEDICINE

## 2025-01-05 PROCEDURE — B2151ZZ FLUOROSCOPY OF LEFT HEART USING LOW OSMOLAR CONTRAST: ICD-10-PCS | Performed by: INTERNAL MEDICINE

## 2025-01-05 PROCEDURE — 93458 L HRT ARTERY/VENTRICLE ANGIO: CPT | Performed by: INTERNAL MEDICINE

## 2025-01-05 PROCEDURE — 25010000002 MAGNESIUM SULFATE 2 GM/50ML SOLUTION: Performed by: HOSPITALIST

## 2025-01-05 PROCEDURE — 25010000002 LIDOCAINE PF 1% 1 % SOLUTION: Performed by: INTERNAL MEDICINE

## 2025-01-05 PROCEDURE — 85025 COMPLETE CBC W/AUTO DIFF WBC: CPT

## 2025-01-05 PROCEDURE — 25510000001 IOPAMIDOL PER 1 ML: Performed by: INTERNAL MEDICINE

## 2025-01-05 PROCEDURE — 80048 BASIC METABOLIC PNL TOTAL CA: CPT | Performed by: HOSPITALIST

## 2025-01-05 PROCEDURE — 84484 ASSAY OF TROPONIN QUANT: CPT | Performed by: HOSPITALIST

## 2025-01-05 PROCEDURE — 85520 HEPARIN ASSAY: CPT

## 2025-01-05 PROCEDURE — B2111ZZ FLUOROSCOPY OF MULTIPLE CORONARY ARTERIES USING LOW OSMOLAR CONTRAST: ICD-10-PCS | Performed by: INTERNAL MEDICINE

## 2025-01-05 PROCEDURE — C1769 GUIDE WIRE: HCPCS | Performed by: INTERNAL MEDICINE

## 2025-01-05 PROCEDURE — 80061 LIPID PANEL: CPT | Performed by: HOSPITALIST

## 2025-01-05 PROCEDURE — 83735 ASSAY OF MAGNESIUM: CPT | Performed by: HOSPITALIST

## 2025-01-05 PROCEDURE — 83036 HEMOGLOBIN GLYCOSYLATED A1C: CPT | Performed by: HOSPITALIST

## 2025-01-05 PROCEDURE — 25010000002 MIDAZOLAM PER 1 MG: Performed by: INTERNAL MEDICINE

## 2025-01-05 PROCEDURE — 25010000002 HEPARIN (PORCINE) PER 1000 UNITS: Performed by: INTERNAL MEDICINE

## 2025-01-05 PROCEDURE — 25810000003 SODIUM CHLORIDE 0.9 % SOLUTION: Performed by: INTERNAL MEDICINE

## 2025-01-05 PROCEDURE — 25010000002 THIAMINE HCL 200 MG/2ML SOLUTION: Performed by: HOSPITALIST

## 2025-01-05 PROCEDURE — 99239 HOSP IP/OBS DSCHRG MGMT >30: CPT | Performed by: INTERNAL MEDICINE

## 2025-01-05 PROCEDURE — 99222 1ST HOSP IP/OBS MODERATE 55: CPT | Performed by: INTERNAL MEDICINE

## 2025-01-05 PROCEDURE — 93306 TTE W/DOPPLER COMPLETE: CPT

## 2025-01-05 RX ORDER — LIDOCAINE HYDROCHLORIDE 10 MG/ML
INJECTION, SOLUTION EPIDURAL; INFILTRATION; INTRACAUDAL; PERINEURAL
Status: DISCONTINUED | OUTPATIENT
Start: 2025-01-05 | End: 2025-01-05 | Stop reason: HOSPADM

## 2025-01-05 RX ORDER — HEPARIN SODIUM 1000 [USP'U]/ML
INJECTION, SOLUTION INTRAVENOUS; SUBCUTANEOUS
Status: DISCONTINUED | OUTPATIENT
Start: 2025-01-05 | End: 2025-01-05 | Stop reason: HOSPADM

## 2025-01-05 RX ORDER — SODIUM CHLORIDE 9 MG/ML
100 INJECTION, SOLUTION INTRAVENOUS CONTINUOUS
Status: DISCONTINUED | OUTPATIENT
Start: 2025-01-05 | End: 2025-01-05 | Stop reason: HOSPADM

## 2025-01-05 RX ORDER — NITROGLYCERIN 0.4 MG/1
0.4 TABLET SUBLINGUAL
Qty: 25 TABLET | Refills: 1 | Status: SHIPPED | OUTPATIENT
Start: 2025-01-05

## 2025-01-05 RX ORDER — METOPROLOL SUCCINATE 25 MG/1
25 TABLET, EXTENDED RELEASE ORAL DAILY
Qty: 90 TABLET | Refills: 1 | Status: SHIPPED | OUTPATIENT
Start: 2025-01-06

## 2025-01-05 RX ORDER — ASPIRIN 81 MG/1
81 TABLET ORAL DAILY
Qty: 100 TABLET | Refills: 1 | Status: SHIPPED | OUTPATIENT
Start: 2025-01-06

## 2025-01-05 RX ORDER — MIDAZOLAM HYDROCHLORIDE 1 MG/ML
INJECTION, SOLUTION INTRAMUSCULAR; INTRAVENOUS
Status: DISCONTINUED | OUTPATIENT
Start: 2025-01-05 | End: 2025-01-05 | Stop reason: HOSPADM

## 2025-01-05 RX ORDER — IOPAMIDOL 755 MG/ML
INJECTION, SOLUTION INTRAVASCULAR
Status: DISCONTINUED | OUTPATIENT
Start: 2025-01-05 | End: 2025-01-05 | Stop reason: HOSPADM

## 2025-01-05 RX ORDER — CLOPIDOGREL BISULFATE 75 MG/1
75 TABLET ORAL DAILY
Qty: 90 TABLET | Refills: 1 | Status: SHIPPED | OUTPATIENT
Start: 2025-01-06

## 2025-01-05 RX ORDER — METOPROLOL SUCCINATE 25 MG/1
25 TABLET, EXTENDED RELEASE ORAL DAILY
Status: DISCONTINUED | OUTPATIENT
Start: 2025-01-05 | End: 2025-01-05 | Stop reason: HOSPADM

## 2025-01-05 RX ADMIN — THIAMINE HYDROCHLORIDE 200 MG: 100 INJECTION, SOLUTION INTRAMUSCULAR; INTRAVENOUS at 05:58

## 2025-01-05 RX ADMIN — SODIUM CHLORIDE 100 ML/HR: 9 INJECTION, SOLUTION INTRAVENOUS at 11:54

## 2025-01-05 RX ADMIN — CLOPIDOGREL BISULFATE 75 MG: 75 TABLET ORAL at 09:50

## 2025-01-05 RX ADMIN — ASPIRIN 81 MG: 81 TABLET, COATED ORAL at 09:50

## 2025-01-05 RX ADMIN — Medication 10 ML: at 10:36

## 2025-01-05 RX ADMIN — MAGNESIUM SULFATE HEPTAHYDRATE 2 G: 40 INJECTION, SOLUTION INTRAVENOUS at 00:27

## 2025-01-05 RX ADMIN — METOPROLOL SUCCINATE 25 MG: 25 TABLET, EXTENDED RELEASE ORAL at 11:54

## 2025-01-05 NOTE — H&P
"    [facility] Medicine Services  HISTORY AND PHYSICAL    Patient Name: Dany Rice  : 1960  MRN: [id number]  Primary Care Physician: Bruna Elizabeth APRN  Date of admission: 2025      Subjective   Subjective     Chief Complaint:  Chest pain    HPI:  Dany Rice is a 64 y.o. male with hx of HTN and HLD who presents from [facility] due to chest pain. Onset last night around midnight, started out feeling like indigestion so he took Tums and went to bed, but woke up a few hours later with nausea and vomiting. Says he vomited a couple times. He then began having substernal chest pain with associated dyspnea. He also felt like his arms and legs went numb for a short time. Pain was about a 7 out of 10 at its worst. Relieved by 3 sublingual nitro. Subsequently started on nitro drip at [place] [facility]. Labs showed troponin elevated to 54. EKG showed no acute ischemic changes. CTA chest negative as well. ED spoke with on call Cardiology, and patient was transferred to [facility] for further evaluation and treatment. He received ASA and Plavix load as well as Metoprolol and was started on a heparin drip prior to transfer. Currently patient states he is down to a 2 out of 10 chest pain since starting on the nitro drip.     Remote hx of tobacco abuse, smoked 1 ppd x ~18 years, quit almost 30 years ago. Does occasionally smoke marijuana. Drinks 2-3 beers almost daily along with \"a couple shots\" of bourbon. He has never had withdrawal symptoms and has gone several days without drinking with no issues. Denies illicit drug use. Strong family history of heart disease in age 60's.      Personal History     Past Medical History:   Diagnosis Date    Hyperlipidemia     Hypertension          History reviewed. No pertinent surgical history.    Family History: family history is not on file. Parents with MI's in their 60's, father with hx of CABG.    Social History:    Social History     Social History " Narrative    Not on file       Medications:  Available home medication information reviewed.  HYDROcodone-acetaminophen    Allergies   Allergen Reactions    Codeine Nausea Only    Penicillins Nausea Only       Objective   Objective     Vital Signs:   Temp:  [97.7 °F (36.5 °C)-99.6 °F (37.6 °C)] 99.1 °F (37.3 °C)  Heart Rate:  [] 90  Resp:  [16-24] 16  BP: (113-169)/(69-93) 113/70       Physical Exam   Constitutional: Awake, alert  Eyes: PERRLA, sclerae anicteric, no conjunctival injection  HENT: NCAT, mucous membranes moist  Neck: Supple, no thyromegaly, no lymphadenopathy, trachea midline  Respiratory: Clear to auscultation bilaterally, nonlabored respirations   Cardiovascular: RRR, no murmurs, rubs, or gallops, palpable pedal pulses bilaterally  Gastrointestinal: Positive bowel sounds, soft, nontender, nondistended  Musculoskeletal: No bilateral ankle edema, no clubbing or cyanosis to extremities  Psychiatric: Appropriate affect, cooperative  Neurologic: Oriented x 3, strength symmetric in all extremities, Cranial Nerves grossly intact to confrontation, speech clear  Skin: No rashes      Result Review:  I have personally reviewed the results from the time of this admission to 1/4/2025 19:37 EST and agree with these findings:  [x]  Laboratory list / accordion  []  Microbiology  [x]  Radiology  [x]  EKG/Telemetry   []  Cardiology/Vascular   []  Pathology  [x]  Old records  []  Other:    LAB RESULTS:      Lab 01/04/25  1735 01/04/25  1430   WBC  --  15.33*   HEMOGLOBIN  --  13.3   HEMATOCRIT  --  37.1*   PLATELETS  --  413   NEUTROS ABS  --  14.28*   IMMATURE GRANS (ABS)  --  0.03   LYMPHS ABS  --  0.55*   MONOS ABS  --  0.35   EOS ABS  --  0.10   MCV  --  87.3   LACTATE 2.6* 3.8*   PROTIME  --  13.0   INR  --  0.93   APTT  --  25.2*         Lab 01/04/25  1430   SODIUM 140   POTASSIUM 3.7   CHLORIDE 101   CO2 19.3*   ANION GAP 19.7*   BUN 23   CREATININE 1.09   EGFR 76.7   GLUCOSE 183*   CALCIUM 10.7*    MAGNESIUM 1.5*         Lab 01/04/25  1430   TOTAL PROTEIN 7.6   ALBUMIN 4.6   GLOBULIN 3.0   ALT (SGPT) 33   AST (SGOT) 30   BILIRUBIN 0.7   ALK PHOS 72   LIPASE 18         Lab 01/04/25  1527 01/04/25  1430   PROBNP  --  1,039.0*   HSTROP T 56* 54*                     Microbiology Results (last 10 days)       Procedure Component Value - Date/Time    COVID-19, FLU A/B, RSV PCR 1 HR TAT - Swab, Nasopharynx [[id number]]  (Normal) Collected: 01/04/25 1439    Lab Status: Final result Specimen: Swab from Nasopharynx Updated: 01/04/25 1526     COVID19 Not Detected     Influenza A PCR Not Detected     Influenza B PCR Not Detected     RSV, PCR Not Detected    Narrative:      Fact sheet for providers: https://www.fda.gov/media/824943/download    Fact sheet for patients: https://www.fda.gov/media/001005/download    Test performed by PCR.            CT Angiogram Chest Pulmonary Embolism    Result Date: 1/4/2025  CT ANGIOGRAM CHEST PULMONARY EMBOLISM Date of Exam: 1/4/2025 2:33 PM EST Indication: tachycardia and chest pain. Comparison: None available. Technique: Axial CT images were obtained of the chest after the uneventful intravenous administration of 64 mL Isovue-370 utilizing pulmonary embolism protocol.  In addition, a 3-D volume rendered image was created for interpretation.  Reconstructed coronal and sagittal images were also obtained. Automated exposure control and iterative construction methods were used. Findings: Diagnostic quality: Contrast opacification of the pulmonary arterial circulation is adequate for assessment of pulmonary embolism. Study is not significantly limited by respiratory motion artifact. Pulmonary arteries: No evidence of pulmonary embolus. Normal main pulmonary artery diameter. Heart and pericardium:No flattening of the interventricular septum. No coronary artery calcification. No substantial pericardial effusion. Vessels:Normal caliber aorta. No atherosclerotic calcification. No evidence of  acute aortic injury. Venous structures including the superior vena cava and inferior vena cava appear grossly patent. Mediastinum:Unremarkable appearance of the esophagus. No enlarged or suspicious mediastinal or hilar lymphadenopathy. No anterior mediastinal masses. Lower neck:No suspicious or enlarged supraclavicular or axillary lymphadenopathy. Normal appearance of the thyroid. Pulmonary parenchyma:No evidence of pulmonary infarct. No suspicious pulmonary nodules. Pleura:No evidence of pleural effusion or pneumothorax. Airways:Central and segmental airways are clear. Chest wall and bones:No acute osseous abnormality. No substantial degenerative changes of thoracic spine. Unremarkable appearance of soft tissues. Upper abdomen:No lesion seen within the visualized liver. Upper abdomen is unremarkable.     Impression: Impression: No evidence of pulmonary embolus. Electronically Signed: Abel Ramos MD  1/4/2025 3:00 PM EST  Workstation ID: [id number]         Assessment & Plan   Assessment & Plan       NSTEMI (non-ST elevated myocardial infarction)        63 yo M with hx of HTN and HLD who presents from [facility] [place] due to chest pain and concern for NSTEMI.    NSTEMI  --Troponin 54-->56  --EKG at [place] ED no acute ischemic changes noted, repeat in AM and as needed for chest pain  --Repeat troponin now and in AM  --s/p ASA and Plavix load at [place] ED, continue ASA and Plavix  --Continue heparin and nitro drips  --Cardiology consulted for AM  --NPO after midnight    HTN  HLD  --Reports BP is well controlled at baseline, continue home Lisinopril  --Continue statin  --Check lipid panel in AM    Alcohol use  --2-3 beers daily plus a couple shots of bourbon  --Has gone several days without drinking before without any withdrawal symptoms  --CIWA protocol and PRN Valium  --Thiamine, folic acid, MVI      VTE Prophylaxis:  Pharmacologic & mechanical VTE prophylaxis orders are present.          CODE STATUS:    Code  Status and Medical Interventions: CPR (Attempt to Resuscitate); Full Support   Ordered at: 01/04/25 2035     Code Status (Patient has no pulse and is not breathing):    CPR (Attempt to Resuscitate)     Medical Interventions (Patient has pulse or is breathing):    Full Support       Expected Discharge   Expected discharge date/ time has not been documented.     Vangie Morel MD  01/04/25

## 2025-01-05 NOTE — CONSULTS
Date of Hospital Visit: 25  Encounter Provider: Dru Davis MD  Place of Service: [facility]  Patient Name: Dany Rice  :1960  Referral Provider: No ref. provider found  Primary Care Provider: Bruna Elizabeth APRN    Chief complaint/Reason for Consultation: Pain/non-STEMI    History of Present Illness:  The patient is a pleasant 64-year-old male with past medical history of hypertension, dyslipidemia, remote light smoking and family history of CAD in both parents.  Patient states that he awakened from sleep early morning yesterday with a lot of heartburn nausea vomiting and diarrhea.  Subsequently also noted some chest pain which initially was waxing and waning and then became more intense and was associated with shortness of breath.  He is experiencing it more with activities.  He decided to go to the emergency department at [facility].  His EKG showed anterior ischemic changes, troponin levels were elevated.  CTA was negative for pulmonary embolism.  We were contacted and decision was made to treat the patient for ACS and admit for further observation.  After admission his nitroglycerin was discontinued.  He has not had any recurrence of chest pain overnight and feels much better this morning.  He has not ambulated yet.  He is currently on IV heparin.   Denies recent fever chills cough or abdominal pain.  No urinary symptoms.  No symptoms of stroke.  All other review of systems are negative.    Social history:  The patient runs a small business and also [place] but does not have any structured exercise program.  He smoked lightly in remote past.  He is a daily drinker with moderate alcohol consumption.  Rare marijuana use.  No other illicit substance abuse    Past Medical History:   Diagnosis Date    Hyperlipidemia     Hypertension        History reviewed. No pertinent surgical history.    Medications Prior to Admission   Medication Sig Dispense  "Refill Last Dose/Taking    lisinopril (PRINIVIL,ZESTRIL) 30 MG tablet Take 1 tablet by mouth Daily.   1/3/2025 at  9:00 AM    rosuvastatin (CRESTOR) 40 MG tablet Take 1 tablet by mouth Daily.   1/3/2025 at  9:00 AM    HYDROcodone-acetaminophen (NORCO) 5-325 MG per tablet Take 1 tablet by mouth Every 6 (Six) Hours as Needed for Pain 15 tablet 0        Social History     Socioeconomic History    Marital status:    Tobacco Use    Smoking status: Former     Average packs/day: 1 pack/day for 7.0 years (7.0 ttl pk-yrs)     Types: Cigarettes     Start date: 1988    Smokeless tobacco: Never   Vaping Use    Vaping status: Never Used   Substance and Sexual Activity    Drug use: Yes     Types: Marijuana     Comment: occasionally    Sexual activity: Defer       History reviewed. No pertinent family history.    REVIEW OF SYSTEMS:     12 point ROS was performed and is Negative except as outlined in HPI     Objective:     Vitals:    01/05/25 0011 01/05/25 0200 01/05/25 0400 01/05/25 0450   BP: 96/65   113/72   BP Location:    Right arm   Patient Position:    Lying   Pulse: 86 86 78 83   Resp:    18   Temp:    98.8 °F (37.1 °C)   TempSrc:    Oral   SpO2:  97% 97% 93%   Weight:       Height:         Body mass index is 27.8 kg/m².  Flowsheet Rows      Flowsheet Row First Filed Value   Admission Height 182.9 cm (72\") Documented at 01/04/2025 1409   Admission Weight 97.5 kg (215 lb) Documented at 01/04/2025 1409            Physical Exam   General: No acute distress, well developed and well nourished.    Skin: Skin is warm and dry. No obvious cyanosis, erythema or pallor.   HEENT: Atraumatic, normocephalic, no conjunctival pallor, no scleral icterus.   Neck: Supple, no JVD. Normal carotid upstrokes, no bruits.    Chest:No respiratory distress. No chest wall tenderness. Breath sounds are normal. No wheezes, rhonchi or rales.  Cardiovascular: Normal S1 and S2, no murmur, gallop or rub. PMI is not displaced.    Pulses:Radial and " pedal pulses are 2+ and symmetric.    Abdomen: Soft, nontender, normal bowel sounds.   Musculoskeletal/Extremities:  No clubbing, cyanosis or edema. No gross deformity.   Neurological: Alert and oriented to person, place, and time, no gross focal deficits.   Psychiatric: Normal mood and affect.Speech and behavior is normal.    Lab Review:                Results from last 7 days   Lab Units 01/05/25  0407   SODIUM mmol/L 140   POTASSIUM mmol/L 3.6   CHLORIDE mmol/L 104   CO2 mmol/L 22.0   BUN mg/dL 21   CREATININE mg/dL 0.96   GLUCOSE mg/dL 102*   CALCIUM mg/dL 9.4     Results from last 7 days   Lab Units 01/05/25  0407 01/04/25 2015 01/04/25  1527   HSTROP T ng/L 58* 71* 56*     Results from last 7 days   Lab Units 01/05/25  0407   WBC 10*3/mm3 14.32*   HEMOGLOBIN g/dL 12.5*   HEMATOCRIT % 37.0*   PLATELETS 10*3/mm3 342     Results from last 7 days   Lab Units 01/04/25 2015 01/04/25  1430   INR   --  0.93   APTT seconds 35.7* 25.2*     Results from last 7 days   Lab Units 01/05/25  0407   MAGNESIUM mg/dL 2.7*     Results from last 7 days   Lab Units 01/05/25  0407   CHOLESTEROL mg/dL 124   TRIGLYCERIDES mg/dL 85   HDL CHOL mg/dL 61*   LDL CHOL mg/dL 47     @LABRCNT(bnp)@  Imaging Results (Last 24 Hours)       Procedure Component Value Units Date/Time    CT Angiogram Chest Pulmonary Embolism [[id number]] Collected: 01/04/25 1455     Updated: 01/04/25 1503    Narrative:      CT ANGIOGRAM CHEST PULMONARY EMBOLISM    Date of Exam: 1/4/2025 2:33 PM EST    Indication: tachycardia and chest pain.    Comparison: None available.    Technique: Axial CT images were obtained of the chest after the uneventful intravenous administration of 64 mL Isovue-370 utilizing pulmonary embolism protocol.  In addition, a 3-D volume rendered image was created for interpretation.  Reconstructed   coronal and sagittal images were also obtained. Automated exposure control and iterative construction methods were used.      Findings:  Diagnostic  quality: Contrast opacification of the pulmonary arterial circulation is adequate for assessment of pulmonary embolism. Study is not significantly limited by respiratory motion artifact.    Pulmonary arteries: No evidence of pulmonary embolus. Normal main pulmonary artery diameter.    Heart and pericardium:No flattening of the interventricular septum. No coronary artery calcification. No substantial pericardial effusion.    Vessels:Normal caliber aorta. No atherosclerotic calcification. No evidence of acute aortic injury. Venous structures including the superior vena cava and inferior vena cava appear grossly patent.    Mediastinum:Unremarkable appearance of the esophagus. No enlarged or suspicious mediastinal or hilar lymphadenopathy. No anterior mediastinal masses.    Lower neck:No suspicious or enlarged supraclavicular or axillary lymphadenopathy. Normal appearance of the thyroid.    Pulmonary parenchyma:No evidence of pulmonary infarct. No suspicious pulmonary nodules.    Pleura:No evidence of pleural effusion or pneumothorax.    Airways:Central and segmental airways are clear.    Chest wall and bones:No acute osseous abnormality. No substantial degenerative changes of thoracic spine. Unremarkable appearance of soft tissues.    Upper abdomen:No lesion seen within the visualized liver. Upper abdomen is unremarkable.      Impression:      Impression:  No evidence of pulmonary embolus.        Electronically Signed: Abel Ramos MD    1/4/2025 3:00 PM EST    Workstation ID: [id number]             EKG: Sinus rhythm, incomplete RBBB, no acute changes.         Assessment:   ACS/non-STEMI.  History of hypertension.  Dyslipidemia.  Well-controlled.    Plan:   Continue aspirin Plavix and intravenous heparin.  Metoprolol 25 mg twice daily, hold of lisinopril for now due to borderline blood pressure.  Continue current high intensity statin therapy.  Cardiac catheterization study was recommended for further evaluation of  coronary anatomy and further restratification.  This is to be followed by PCI if indicated.  The procedure was explained to the patient/family extensively. Indications, benefits, risks and alternatives were discussed. The patient understands well, and wishes to proceed.   We will plan on proceeding to procedure today.  Thank you for this consultation, we will follow.    Dru Davis MD, FACC, [facility]

## 2025-01-05 NOTE — OUTREACH NOTE
Prep Survey      Flowsheet Row Responses   [facility] facility patient discharged from? [facility]   Is LACE score < 7 ? Yes   Eligibility Readm Mgmt   Discharge diagnosis NSTEMI, heart cath - non obstructive disease   Does the patient have one of the following disease processes/diagnoses(primary or secondary)? Acute MI (STEMI,NSTEMI)   Does the patient have Home health ordered? No   Is there a DME ordered? No   Prep survey completed? Yes            Chelsea Cabrera Registered Nurse

## 2025-01-05 NOTE — DISCHARGE SUMMARY
"    [facility]  DISCHARGE SUMMARY    Patient Name: Dany Rice  : 1960  MRN: [id number]    Date of Admission: 2025  Date of Discharge: 25  Length of Stay: 1  Primary Care Physician: Bruna Elizabeth APRN    Consults       Date and Time Order Name Status Description    2025  7:36 PM Inpatient Cardiology Consult            Hospital Course     Presenting Problem:   NSTEMI (non-ST elevated myocardial infarction) [I21.4]      Active Hospital Problems    Diagnosis  POA    **NSTEMI (non-ST elevated myocardial infarction) [I21.4]  Yes    Alcohol use [Z78.9]  Yes    Essential hypertension [I10]  Yes    Hyperlipidemia [E78.5]  Yes      Resolved Hospital Problems   No resolved problems to display.          Hospital Course:  Dany Rice is a 64 y.o. male with hx of HTN and HLD who presents from [facility] due to chest pain. Onset last night around midnight, started out feeling like indigestion so he took Tums and went to bed, but woke up a few hours later with nausea and vomiting. Says he vomited a couple times. He then began having substernal chest pain with associated dyspnea. He also felt like his arms and legs went numb for a short time. Pain was about a 7 out of 10 at its worst. Relieved by 3 sublingual nitro. Subsequently started on nitro drip at [facility]. Labs showed troponin elevated to 54. EKG showed no acute ischemic changes. CTA chest negative as well. ED spoke with on call Cardiology, and patient was transferred to [facility] for further evaluation and treatment. He received ASA and Plavix load as well as Metoprolol and was started on a heparin drip prior to transfer. Currently patient states he is down to a 2 out of 10 chest pain since starting on the nitro drip.      Remote hx of tobacco abuse, smoked 1 ppd x ~18 years, quit almost 30 years ago. Does occasionally smoke marijuana. Drinks 2-3 beers almost daily along with \"a couple shots\" of " alpesh. He has never had withdrawal symptoms and has gone several days without drinking with no issues. Denies illicit drug use. Strong family history of heart disease in age 60's.     Patients chest pain and nausea resolved.  He had a C with non occlusive disease  Presumed Takotsubo CMP  Cardiology recommends medical therapy  Patient discharge home after echo pending at this time    Discharge Follow Up Recommendations for labs/diagnostics:  PCP in one week  Cardiology in one month  Procedure(s):  Left Heart Cath       Day of Discharge     HPI:   Patient feels good and wants to go home.  Vital Signs:   Temp:  [97.7 °F (36.5 °C)-99.6 °F (37.6 °C)] 98.3 °F (36.8 °C)  Heart Rate:  [] 91  Resp:  [16-24] 16  BP: ()/(62-93) 129/76     Physical Exam:  Patient is alert and talkative in no distress at rest  Neck is without mass or JVD  Heart is Reg wo murmur  Lungs are clear wo wheeze or crackle  Abd is soft without HSM or mass, not tender or distended  MAEW  Skin is without rash  Neurologic exam in nonfocal   Mood is appropriate      Pertinent  and/or Most Recent Results       Results from last 7 days   Lab Units 01/05/25  0407 01/04/25  1430   WBC 10*3/mm3 14.32* 15.33*   HEMOGLOBIN g/dL 12.5* 13.3   HEMATOCRIT % 37.0* 37.1*   PLATELETS 10*3/mm3 342 413   SODIUM mmol/L 140 140   POTASSIUM mmol/L 3.6 3.7   CHLORIDE mmol/L 104 101   CO2 mmol/L 22.0 19.3*   BUN mg/dL 21 23   CREATININE mg/dL 0.96 1.09   GLUCOSE mg/dL 102* 183*   CALCIUM mg/dL 9.4 10.7*   PROBNP pg/mL  --  1,039.0*     Results from last 7 days   Lab Units 01/04/25 2015 01/04/25  1430   BILIRUBIN mg/dL  --  0.7   ALK PHOS U/L  --  72   ALT (SGPT) U/L  --  33   AST (SGOT) U/L  --  30   PROTIME Seconds  --  13.0   INR   --  0.93   APTT seconds 35.7* 25.2*      Results from last 7 days   Lab Units 01/05/25  0407   CHOLESTEROL mg/dL 124   TRIGLYCERIDES mg/dL 85   HDL CHOL mg/dL 61*   LDL CHOL mg/dL 47     Results from last 7 days   Lab Units  01/05/25  0407 01/04/25 2015 01/04/25  1527 01/04/25  1430   HEMOGLOBIN A1C % 5.50  --   --   --    HSTROP T ng/L 58* 71* 56* 54*     Brief Urine Lab Results       None            Cardiac Catheterization/Vascular Study    Result Date: 1/5/2025  Impression: No evidence of hemodynamic significant coronary disease. Takotsubo cardiomyopathy with mild left ventricular systolic dysfunction, estimated EF 50%. Normal hemodynamics. RECOMMENDATIONS: Medical therapy and risk factor management. Optimize GDMT. Future echocardiographic follow-up of LV dysfunction. Indications: ACS/non-STEMI. Access: Right radial. Procedures: Left heart catheterization. Left ventriculogram. Selective coronary angiography. Arterial site hemostasis with radial band. Procedure narrative: The patient was brought to the catheterization lab in a fasting condition.  Access site was prepped and draped in standard sterile fashion.  Lidocaine was injected and arterial access was obtained by percutaneous anterior wall puncture technique.  A 6 Hebrew arterial sheath was placed. Above procedures were performed without complications.  At the conclusion the arterial sheath was removed and hemostasis was achieved.  The patient was transferred to the unit in a stable condition. Hemodynamic Findings: Heart Rate: 87/minute. LV pressure: 135/5-15 mmHg, on pull back no gradient was recorded across the aortic valve. Angiographic Findings: Bilateral coronary dominance. LM: Angiographically normal. LAD: Mild proximal ectasia and minimal luminal irregularities in the mid LAD and the major diagonal branch without hemodynamic significant disease. LCX: Minimal luminal irregularities without hemodynamic significant disease RCA: Codominant vessel which is angiographically normal. LV: Left ventriculogram performed in 30 FONTENOT projection revealed anterolateral and apical hypokinesis with features of Takotsubo cardiomyopathy.  Overall systolic function is mildly depressed with  estimated ejection fraction of 50%.  No significant mitral regurgitation was noted. Complications: No acute procedure related complications.     CT Angiogram Chest Pulmonary Embolism    Result Date: 1/4/2025  Impression: Impression: No evidence of pulmonary embolus. Electronically Signed: Abel Ramos MD  1/4/2025 3:00 PM EST  Workstation ID: [id number]         Results for orders placed during the hospital encounter of 01/04/25    Adult Transthoracic Echo Complete W/ Cont if Necessary Per Protocol    Interpretation Summary    Left ventricular systolic function is normal. Left ventricular ejection fraction appears to be 56 - 60%.    Trace to mild mitral regurgitation.          Discharge Details        Discharge Medications        New Medications        Instructions Start Date   aspirin 81 MG EC tablet   81 mg, Oral, Daily   Start Date: January 6, 2025     clopidogrel 75 MG tablet  Commonly known as: PLAVIX   75 mg, Oral, Daily   Start Date: January 6, 2025     metoprolol succinate XL 25 MG 24 hr tablet  Commonly known as: TOPROL-XL   25 mg, Oral, Daily   Start Date: January 6, 2025     nitroglycerin 0.4 MG SL tablet  Commonly known as: NITROSTAT   1 under the tongue as needed for angina, may repeat q5mins for up three doses             Continue These Medications        Instructions Start Date   HYDROcodone-acetaminophen 5-325 MG per tablet  Commonly known as: NORCO   Take 1 tablet by mouth Every 6 (Six) Hours as Needed for Pain      lisinopril 30 MG tablet  Commonly known as: PRINIVIL,ZESTRIL   30 mg, Daily      rosuvastatin 40 MG tablet  Commonly known as: CRESTOR   40 mg, Daily                 Discharge Disposition:  Home or Self Care    Discharge Diet:    Regular  Discharge Activity:    As Tolerated  Special Instructions:    No future appointments.    Additional Instructions for the Follow-ups that You Need to Schedule       Discharge Follow-up with Specialty: Dr. Davis; 6 Weeks   As directed      Specialty:  Dr. Davis   Follow Up: 6 Weeks                Time Spent on Discharge:  35 minutes    Electronically signed by Gisela Guzman MD 01/05/25 12:22 EST

## 2025-01-07 LAB
QT INTERVAL: 352 MS
QT INTERVAL: 366 MS
QTC INTERVAL: 476 MS
QTC INTERVAL: 499 MS

## 2025-01-13 ENCOUNTER — READMISSION MANAGEMENT (OUTPATIENT)
Dept: CALL CENTER | Facility: HOSPITAL | Age: 65
End: 2025-01-13
Payer: COMMERCIAL

## 2025-01-13 NOTE — OUTREACH NOTE
AMI Week 1 Survey      Flowsheet Row Responses   [facility] patient discharged from? [place]   Does the patient have one of the following disease processes/diagnoses(primary or secondary)? Acute MI (STEMI,NSTEMI)   Week 1 attempt successful? Yes   Call start time 0917   Call end time 0923   Discharge diagnosis NSTEMI, heart cath - non obstructive disease   Is patient permission given to speak with other caregiver? Yes   Person spoke with today (if not patient) and relationship spouse and patient   Meds reviewed with patient/caregiver? Yes   Does the patient have all prescriptions related to this admission filled (includes statins,anticoagulants,HTN meds,anti-arrhythmia meds) Yes   Is the patient taking all medications as directed (includes completed medication regime)? Yes   Does the patient have a primary care provider?  Yes   Does the patient have an appointment with their PCP,cardiologist,or clinic within 7 days of discharge? No   Comments regarding PCP Follow up with Bruna Elizabeth PCP   Nursing Interventions Educated patient on importance of making appointment, Advised patient to make appointment   Has the patient kept scheduled appointments due by today? N/A   Comments Call to schedule 6 weeks follow up with cardiology   Has home health visited the patient within 72 hours of discharge? N/A   Psychosocial issues? No   Did the patient receive a copy of their discharge instructions? Yes   Nursing interventions Reviewed instructions with patient   What is the patient's perception of their health status since discharge? Improving   Is the patient/caregiver able to teach back signs and symptoms of when to call for help immediately: Sudden chest discomfort, Sudden discomfort in arms, back, neck or jaw, Shortness of breath at any time, Sudden sweating or clammy skin, Nausea or vomiting, Dizziness or lightheadedness   Nursing interventions Nurse provided patient education   Is the patient/caregiver able to teach  back ways to prevent a second heart attack: Take medications   If the patient is a current smoker, are they able to teach back resources for cessation? Not a smoker   Is the patient/caregiver able to teach back the hierarchy of who to call/visit for symptoms/problems? PCP, Specialist, Home health nurse, Urgent Care, ED, 911 Yes   Week 1 call completed? Yes   Is the patient interested in additional calls from an ambulatory ? No   Would this patient benefit from a Referral to [facility] Social Work? No   Call end time 0923            NANCY ORELLANA - Registered Nurse

## 2025-01-23 ENCOUNTER — READMISSION MANAGEMENT (OUTPATIENT)
Dept: CALL CENTER | Facility: HOSPITAL | Age: 65
End: 2025-01-23
Payer: COMMERCIAL

## 2025-01-23 NOTE — OUTREACH NOTE
AMI Week 2 Survey      Flowsheet Row Responses   [facility] patient discharged from? [place]   Does the patient have one of the following disease processes/diagnoses(primary or secondary)? Acute MI (STEMI,NSTEMI)   Week 2 attempt successful? Yes   Call start time 1426   Call end time 1429   Discharge diagnosis NSTEMI, heart cath - non obstructive disease   Person spoke with today (if not patient) and relationship Sharion, spouse   Meds reviewed with patient/caregiver? Yes   Is the patient having any side effects they believe may be caused by any medication additions or changes? No   Does the patient have all prescriptions related to this admission filled (includes statins,anticoagulants,HTN meds,anti-arrhythmia meds) Yes   Is the patient taking all medications as directed (includes completed medication regime)? Yes   Does the patient have a primary care provider?  Yes   Does the patient have an appointment with their PCP,cardiologist,or clinic within 7 days of discharge? Yes   Has the patient kept scheduled appointments due by today? Yes   Has home health visited the patient within 72 hours of discharge? N/A   Psychosocial issues? No   Did the patient receive a copy of their discharge instructions? Yes   Nursing interventions Reviewed instructions with patient   What is the patient's perception of their health status since discharge? Improving   Nursing interventions Nurse provided patient education   Is the patient/caregiver able to teach back signs and symptoms of when to call for help immediately: Sudden chest discomfort, Sudden discomfort in arms, back, neck or jaw, Shortness of breath at any time, Sudden sweating or clammy skin, Nausea or vomiting, Dizziness or lightheadedness, Irregular or rapid heart rate   Nursing interventions Nurse provided patient education   Is the pateint /caregiver able to teach back the importance of cardiac rehab? Yes   Is the patient/caregiver able to teach back lifestyle  changes to help prevent MIs Heart healthy diet, Maintaining a healthy weight, Reducing stress, Regular exercise as approved by provider   Is the patient/caregiver able to teach back ways to prevent a second heart attack: Take medications, Follow up with MD, Participate in Cardiac Rehab, Manage risk factors   Is the patient/caregiver able to teach back the hierarchy of who to call/visit for symptoms/problems? PCP, Specialist, Home health nurse, Urgent Care, ED, 911 Yes   Week 2 call completed? Yes   Call end time 0624            Mary Cabrera Registered Nurse

## 2025-01-28 ENCOUNTER — TELEPHONE (OUTPATIENT)
Dept: CARDIOLOGY | Facility: CLINIC | Age: 65
End: 2025-01-28
Payer: COMMERCIAL

## 2025-01-28 NOTE — TELEPHONE ENCOUNTER
Called pt after he left voicemail message. He said Saturday he started having a lot of itching in his arms and hands, and took Benadryl. Later noticed his lips swelling.   Sunday he took his meds again, but hasn't taken metoprolol or Plavix since Monday after researching they could have these side effects.     [facility] 1/5/2025, pt states he had no blockage or stents placed, but dx with cardiomyopathy    Blood pressures this week  94/62  118/64  Today 112/78, HR 84     Will f/u with Dr. Davis for further changes.

## 2025-01-29 RX ORDER — METOPROLOL SUCCINATE 25 MG/1
12.5 TABLET, EXTENDED RELEASE ORAL DAILY
Qty: 90 TABLET | Refills: 2 | Status: SHIPPED | OUTPATIENT
Start: 2025-01-29

## 2025-01-29 NOTE — TELEPHONE ENCOUNTER
Called pt and made him aware of the changes Dr. Davis recommends. Told pt to definitely call us back if he has any further problems after restarting the metoprolol. Told him that metoprolol is a good drug for his cardiomyopathy. Pt verbalizes understanding and no further questions at this time.

## 2025-02-03 NOTE — TELEPHONE ENCOUNTER
Returned voicemail message to pt. He states that he has continued to have an allergic reaction. He continued metoprolol as advised but has had severe joint pains in his wrists, elbows, shoulder, ring fingers, along with swelling in the fingers. Stated that the pain would be in one joint for one day then stop and start in a different joint and move around like that.  He stopped taking metoprolol Friday, 1/31. The itching went away Friday and Saturday.  Today (2/3) is the first day that the joint pain has decreased but not fully resolved.   He has been taking Advil and Benadryl throughout the day everyday.   Will make Dr. Davis aware for any changes.

## 2025-02-17 NOTE — PROGRESS NOTES
[facility] Cardiology    Encounter Date: 2025    Patient ID: Dany Rice is a 64 y.o. male.  : 1960     PCP: Bruna Elizabeth APRN       Chief Complaint: Hypertension      PROBLEM LIST:  ACS/non-STEMI  C, EF 50%. No evidence of significant coronary disease. Takotsubo cardiomyopathy with mild LV systolic dysfunction.  Chest pain  Echo, 2025: EF 56-60%. Trace to mild MR.   Mitral regurgitation  Echo, 2025: LVEF 50-60%.  Trace to mild MR  Hypertension  Dyslipidemia  Tobacco abuse      History of Present Illness  Patient presents today for a hospital follow-up with a history of ACS/non-STEMI and cardiac risk factors.  The patient was hospitalized in January when he presented with nausea, vomiting and chest pain.  His troponins were elevated but relatively flat.  He ultimately underwent cardiac catheterization which showed no significant CAD and nothing to account for his symptoms.  During cath his LV systolic function was mildly low end of normal at 50% and there was evidence of possible Takotsubo..  He had echocardiogram that showed trace to mild MR and normal LVEF 56 to 60%.  He did have lactic acid that was elevated during his stay.  He was discharged home with Plavix and metoprolol.  Unfortunately he developed itching and swollen lips.  He stopped his metoprolol and Plavix and his symptoms went away.  Overall he feels great.  He attributes his symptoms to food poisoning.  He has been taking aspirin, statin and is on lisinopril.  His initial blood pressure management on arrival was elevated at 148/92 but recheck by me was 120/70.  He routinely follows with his family practitioner and reports 120s over 70s is his normal blood pressure on his lisinopril.    Allergies   Allergen Reactions    Metoprolol Swelling    Codeine Nausea Only    Penicillins Nausea Only         Current Outpatient Medications:     aspirin 81 MG EC tablet, Take 1 tablet by mouth  "Daily., Disp: 100 tablet, Rfl: 1    esomeprazole (nexIUM) 40 MG capsule, Take 1 capsule by mouth Every Morning Before Breakfast., Disp: , Rfl:     lisinopril (PRINIVIL,ZESTRIL) 30 MG tablet, Take 1 tablet by mouth Daily., Disp: , Rfl:     nitroglycerin (NITROSTAT) 0.4 MG SL tablet, Dissolve 1 tablet under the tongue as needed for angina (chest pain). May repeat dose every 5 minutes for up to 3 doses., Disp: 25 tablet, Rfl: 1    Omega-3 Fatty Acids (fish oil) 1000 MG capsule capsule, Take  by mouth Daily With Breakfast., Disp: , Rfl:     rosuvastatin (CRESTOR) 40 MG tablet, Take 1 tablet by mouth Daily., Disp: , Rfl:     sildenafil (Viagra) 50 MG tablet, Take 1 tablet by mouth Daily As Needed for Erectile Dysfunction., Disp: , Rfl:     The following portions of the patient's history were reviewed and updated as appropriate: allergies, current medications, past family history, past medical history, past social history, past surgical history and problem list.    ROS  Review of Systems   14 point ROS negative except for that listed in the HPI.         Objective:     /70   Pulse 74   Ht 182.9 cm (72\")   Wt 98 kg (216 lb)   SpO2 94%   BMI 29.29 kg/m²      Physical Exam  Constitutional: Patient appears well-developed and well-nourished.   HENT: HEENT exam unremarkable.   Neck: Neck supple. No JVD present. No carotid bruits.   Cardiovascular: Normal rate, regular rhythm and normal heart sounds. No murmur heard.   2+ symmetric pulses.   Pulmonary/Chest: Breath sounds normal. Does not exhibit tenderness.   Abdominal: Abdomen benign.   Musculoskeletal: Does not exhibit edema.   Neurological: Neurological exam unremarkable.   Vitals reviewed.    Data Review:   Lab Results   Component Value Date    GLUCOSE 102 (H) 01/05/2025    BUN 21 01/05/2025    CREATININE 0.96 01/05/2025    EGFR 89.4 01/05/2025    BCR 21.9 01/05/2025     01/05/2025    K 3.6 01/05/2025    CO2 22.0 01/05/2025    CALCIUM 9.4 01/05/2025    " ALBUMIN 4.6 01/04/2025    AST 30 01/04/2025    ALT 33 01/04/2025     Lab Results   Component Value Date    CHOL 124 01/05/2025    TRIG 85 01/05/2025    HDL 61 (H) 01/05/2025    LDL 47 01/05/2025      Lab Results   Component Value Date    WBC 14.32 (H) 01/05/2025    RBC 3.98 (L) 01/05/2025    HGB 12.5 (L) 01/05/2025    HCT 37.0 (L) 01/05/2025    MCV 93.0 01/05/2025     01/05/2025     Lab Results   Component Value Date    HGBA1C 5.50 01/05/2025        Procedures       Advance Care Planning   ACP discussion was held with the patient during this visit. Patient does not have an advance directive, information provided.           Assessment:      Diagnosis Plan   1. NSTEMI (non-ST elevated myocardial infarction)  Type II NSTEMI in the setting of likely food poisoning  Would recommend aspirin 81 mg daily for his mild luminal irregularities  Avoid beta-blocker therapy  Okay to stay off Plavix and continue aspirin      2. Essential hypertension  Controlled on lisinopril 30 mg daily      3. Hyperlipidemia, unspecified hyperlipidemia type  Controlled on Crestor 40 mg daily        Plan:   Stable cardiac status.   Continue current medications.  Okay to stay off Plavix and metoprolol but continue aspirin, statin and lisinopril  FU in 12 MO, sooner as needed.  Thank you for allowing us to participate in the care of your patient.       TIERRA Storey      Please note that portions of this note may have been completed with a voice recognition program. Efforts were made to edit the dictations, but occasionally words are mistranscribed.

## 2025-02-21 ENCOUNTER — OFFICE VISIT (OUTPATIENT)
Dept: CARDIOLOGY | Facility: CLINIC | Age: 65
End: 2025-02-21
Payer: COMMERCIAL

## 2025-02-21 VITALS
HEIGHT: 72 IN | WEIGHT: 216 LBS | OXYGEN SATURATION: 94 % | DIASTOLIC BLOOD PRESSURE: 70 MMHG | HEART RATE: 74 BPM | SYSTOLIC BLOOD PRESSURE: 120 MMHG | BODY MASS INDEX: 29.26 KG/M2

## 2025-02-21 DIAGNOSIS — E78.5 HYPERLIPIDEMIA, UNSPECIFIED HYPERLIPIDEMIA TYPE: ICD-10-CM

## 2025-02-21 DIAGNOSIS — I21.4 NSTEMI (NON-ST ELEVATED MYOCARDIAL INFARCTION): Primary | ICD-10-CM

## 2025-02-21 DIAGNOSIS — I10 ESSENTIAL HYPERTENSION: ICD-10-CM

## 2025-02-21 PROCEDURE — 99214 OFFICE O/P EST MOD 30 MIN: CPT | Performed by: NURSE PRACTITIONER

## 2025-02-21 RX ORDER — SILDENAFIL 50 MG/1
50 TABLET, FILM COATED ORAL DAILY PRN
COMMUNITY

## 2025-02-21 RX ORDER — CHLORAL HYDRATE 500 MG
CAPSULE ORAL
COMMUNITY

## 2025-02-21 RX ORDER — ESOMEPRAZOLE MAGNESIUM 40 MG/1
40 CAPSULE, DELAYED RELEASE ORAL
COMMUNITY

## 2025-05-01 ENCOUNTER — OFFICE VISIT (OUTPATIENT)
Age: 65
End: 2025-05-01
Payer: COMMERCIAL

## 2025-05-01 VITALS
WEIGHT: 216.2 LBS | HEIGHT: 72 IN | BODY MASS INDEX: 29.28 KG/M2 | SYSTOLIC BLOOD PRESSURE: 100 MMHG | DIASTOLIC BLOOD PRESSURE: 62 MMHG

## 2025-05-01 DIAGNOSIS — M25.561 PAIN IN BOTH KNEES, UNSPECIFIED CHRONICITY: Primary | ICD-10-CM

## 2025-05-01 DIAGNOSIS — M17.0 BILATERAL PRIMARY OSTEOARTHRITIS OF KNEE: ICD-10-CM

## 2025-05-01 DIAGNOSIS — M25.562 PAIN IN BOTH KNEES, UNSPECIFIED CHRONICITY: Primary | ICD-10-CM

## 2025-05-01 RX ORDER — CELECOXIB 200 MG/1
CAPSULE ORAL
Qty: 60 CAPSULE | Refills: 1 | Status: SHIPPED | OUTPATIENT
Start: 2025-05-01

## 2025-05-01 NOTE — PROGRESS NOTES
[facility] [place] [facility] Office Visit     Office Visit       Date: 05/01/2025   Patient Name: Dany Rice  MRN: [id number]  YOB: 1960    Referring Physician: Bruna Elizabeth APRN     Chief Complaint:   Chief Complaint   Patient presents with    Pain     Bilateral knee pain       History of Present Illness:   Dany Rice is a 64 y.o. male who presents with bilateral knee pain for 15 year(s). Onset atraumatic and gradual in nature. Pain is localized to the joint line and is a 6/10 on the pain scale. Pain is described as dull and sharp . Associated symptoms include pain, popping, grinding, and stiffness. The pain is worse with walking and sleeping; pain medication and/or NSAID make it better. Previous treatments have included: bracing, NSAIDS, and steroid injection (last injection 2015) since symptom onset. Although some transient relief was reported with these interventions, these conservative measures have failed and symptoms have persisted. The patient is limited in daily activities and has had a significant decrease in quality of life as a result. He denies fevers, chills, or constitutional symptoms.    Subjective   Review of Systems: Review of Systems   Constitutional:  Negative for chills, fever, unexpected weight gain and unexpected weight loss.   HENT:  Negative for congestion, postnasal drip and rhinorrhea.    Eyes:  Negative for blurred vision.   Respiratory:  Negative for shortness of breath.    Cardiovascular:  Negative for leg swelling.   Gastrointestinal:  Negative for abdominal pain, nausea and vomiting.   Genitourinary:  Negative for difficulty urinating.   Musculoskeletal:  Positive for arthralgias. Negative for gait problem, joint swelling and myalgias.   Skin:  Negative for skin lesions and wound.   Neurological:  Negative for dizziness, weakness, light-headedness and numbness.   Hematological:  Does not bruise/bleed easily.    Psychiatric/Behavioral:  Negative for depressed mood.         I have reviewed the following portions of the patient's history:History of Present Illness and review of systems.    Past Medical History:   Past Medical History:   Diagnosis Date    CTS (carpal tunnel syndrome)     Hyperlipidemia     Hypertension     Knee swelling        Past Surgical History:   Past Surgical History:   Procedure Laterality Date    BACK SURGERY      CARDIAC CATHETERIZATION N/A 01/05/2025    Procedure: Left Heart Cath;  Surgeon: Dru Davis MD;  Location: [facility] CATH INVASIVE LOCATION;  Service: Cardiology;  Laterality: N/A;    HAND SURGERY         Family History:   Family History   Problem Relation Age of Onset    Hypertension Mother     Heart attack Mother     Cancer Mother     Diabetes Mother     Osteoporosis Mother     Arrhythmia Father     Hypertension Father     Heart attack Father     Cancer Father     Asthma Paternal Grandmother        Social History:   Social History     Socioeconomic History    Marital status:    Tobacco Use    Smoking status: Former     Average packs/day: 1 pack/day for 15.0 years (15.0 ttl pk-yrs)     Types: Cigarettes, Cigars     Start date: 1/1/1988    Smokeless tobacco: Never   Vaping Use    Vaping status: Never Used   Substance and Sexual Activity    Alcohol use: Yes     Alcohol/week: 26.0 standard drinks of alcohol     Types: 14 Cans of beer, 8 Shots of liquor, 4 Drinks containing 0.5 oz of alcohol per week     Comment: 10    Drug use: Yes     Types: Marijuana     Comment: occasionally    Sexual activity: Yes     Partners: Female     Birth control/protection: None       Medications:   Current Outpatient Medications:     aspirin 81 MG EC tablet, Take 1 tablet by mouth Daily., Disp: 100 tablet, Rfl: 1    celecoxib (CeleBREX) 200 MG capsule, Take 1 tablet orally 2 times per day with meals., Disp: 60 capsule, Rfl: 1    esomeprazole (nexIUM) 40 MG capsule, Take 1 capsule by mouth Every Morning  "Before Breakfast., Disp: , Rfl:     lisinopril (PRINIVIL,ZESTRIL) 30 MG tablet, Take 1 tablet by mouth Daily., Disp: , Rfl:     nitroglycerin (NITROSTAT) 0.4 MG SL tablet, Dissolve 1 tablet under the tongue as needed for angina (chest pain). May repeat dose every 5 minutes for up to 3 doses., Disp: 25 tablet, Rfl: 1    Omega-3 Fatty Acids (fish oil) 1000 MG capsule capsule, Take  by mouth Daily With Breakfast., Disp: , Rfl:     rosuvastatin (CRESTOR) 40 MG tablet, Take 1 tablet by mouth Daily., Disp: , Rfl:     sildenafil (Viagra) 50 MG tablet, Take 1 tablet by mouth Daily As Needed for Erectile Dysfunction., Disp: , Rfl:     Allergies:   Allergies   Allergen Reactions    Metoprolol Swelling    Codeine Nausea Only    Penicillins Nausea Only       I reviewed the patient's chief complaint, history of present illness, review of systems, past medical history, surgical history, family history, social history, medications and allergy list.     Objective    Vital Signs:   Vitals:    05/01/25 0954   BP: 100/62   Weight: 98.1 kg (216 lb 3.2 oz)   Height: 183.5 cm (72.25\")     Body mass index is 29.12 kg/m².   BMI is >= 25 and <30. (Overweight) The following options were offered after discussion;: exercise counseling/recommendations and nutrition counseling/recommendations     Patient reports that he is a former smoker. He quit smoking in 1988.  He has not resumed smoking since that time.  This behavior was applauded and she was encouraged to continue in smoking cessation.  We will continue to monitor at subsequent visits.    Ortho Exam:  Bilateral knee exam:   There is swelling and effusion but no warmth or erythema of the knee.    The skin is clear.    Overall the alignment of the knee is varus   The patient has 5/5 strength with ankle plantar flexion, dorsiflexion, inversion, eversion, and great toe extension.    Sensation is grossly present to light touch in the superficial peroneal, deep peroneal, and tibial nerve " distributions.    The dorsalis pedis pulse is 2+ and the foot is well perfused with brisk capillary refill.   Active ROM is 0° to 110°.   Passive ROM is 0° to 110°.   The knee shows no gross instability to varus/valgus stress testing, anterior/posterior drawer sign, and Lachman testing.    There is tenderness to palpation over the medial/lateral joint line. Patellar grind test is positive.   Hip ROM is full and painless.    Results Review:   Imaging Results (Last 24 Hours)       Procedure Component Value Units Date/Time    XR Knee 4+ View Bilateral [[id number]] Resulted: 05/01/25 1020     Updated: 05/01/25 1020    Narrative:      Indication: pain     Views: Weightbearing views of the knee are submitted.     Impression: There is no fracture subluxation or dislocation. The patella   sits normally in the trochlea. There are no acute findings. There is   medial joint space narrowing with osteophyte formation and subchondral   sclerosis and mild varus deformity. More mild findings in the   patellofemoral joint space. Progression from prior images.     Comparison: 2/23/2016.               Procedures    Assessment / Plan    Assessment/Plan:   Diagnoses and all orders for this visit:    1. Pain in both knees, unspecified chronicity (Primary)  -     XR Knee 4+ View Bilateral    2. Bilateral primary osteoarthritis of knee  -     Large Joint Arthrocentesis; Future  -     celecoxib (CeleBREX) 200 MG capsule; Take 1 tablet orally 2 times per day with meals.  Dispense: 60 capsule; Refill: 1    Plan:  Bilateral medial joint line tenderness.  Radiographs show moderate to severe narrowing of the medial joint space with more mild symptoms in the patellofemoral joint space.  Currently taking Advil.  Prior cortisone injections.  No surgical history.  We discussed a comprehensive nonoperative treatment approach to knee osteoarthritis. This includes topical and oral anti-inflammatory medication, physical therapy, bracing, activity  modification, and periodic as needed corticosteroid or viscosupplementation injections.   Today, we will start treatment with ordering viscosupplementation injections for both knees.  Prescribe Celebrex to help with pain.  Can also supplement with acetaminophen.  Follow-up once injections are approved to begin that series.    Previous studies reviewed: Radiographs bilateral knees 2/23/2016.  Hemoglobin A1c 1/5/2025.  BMP 1/5/2025.    Follow Up:   No follow-ups on file.      Neal Barnes MD  [facility] [place] [facility]

## 2025-05-15 ENCOUNTER — TELEPHONE (OUTPATIENT)
Age: 65
End: 2025-05-15
Payer: COMMERCIAL

## 2025-06-05 ENCOUNTER — TELEPHONE (OUTPATIENT)
Dept: ORTHOPEDIC SURGERY | Facility: CLINIC | Age: 65
End: 2025-06-05
Payer: COMMERCIAL

## 2025-06-06 LAB
QT INTERVAL: 352 MS
QT INTERVAL: 366 MS
QTC INTERVAL: 476 MS
QTC INTERVAL: 499 MS

## 2025-06-23 ENCOUNTER — CLINICAL SUPPORT (OUTPATIENT)
Age: 65
End: 2025-06-23
Payer: COMMERCIAL

## 2025-06-23 DIAGNOSIS — M17.0 BILATERAL PRIMARY OSTEOARTHRITIS OF KNEE: ICD-10-CM

## 2025-06-23 DIAGNOSIS — M25.561 PAIN IN BOTH KNEES, UNSPECIFIED CHRONICITY: Primary | ICD-10-CM

## 2025-06-23 DIAGNOSIS — M25.562 PAIN IN BOTH KNEES, UNSPECIFIED CHRONICITY: Primary | ICD-10-CM

## 2025-06-23 RX ORDER — LIDOCAINE HYDROCHLORIDE 10 MG/ML
4 INJECTION, SOLUTION EPIDURAL; INFILTRATION; INTRACAUDAL; PERINEURAL
Status: COMPLETED | OUTPATIENT
Start: 2025-06-23 | End: 2025-06-23

## 2025-06-23 RX ADMIN — LIDOCAINE HYDROCHLORIDE 4 ML: 10 INJECTION, SOLUTION EPIDURAL; INFILTRATION; INTRACAUDAL; PERINEURAL at 09:55

## 2025-06-23 NOTE — PROGRESS NOTES
Procedure   - Large Joint Arthrocentesis: bilateral knee on 6/23/2025 9:55 AM  Indications: pain  Details: 21 G needle, ultrasound-guided anterolateral approach  Medications (Right): 4 mL lidocaine PF 1% 1 %; 20 mg Sodium Hyaluronate (Viscosup) 20 MG/2ML  Medications (Left): 4 mL lidocaine PF 1% 1 %; 20 mg Sodium Hyaluronate (Viscosup) 20 MG/2ML  Outcome: tolerated well, no immediate complications  Procedure, treatment alternatives, risks and benefits explained, specific risks discussed. Consent was given by the patient. Immediately prior to procedure a time out was called to verify the correct patient, procedure, equipment, support staff and site/side marked as required. Patient was prepped and draped in the usual sterile fashion.        64-year-old male with bilateral knee pain from bilateral knee osteoarthritis.  Here today for ultrasound-guided Euflexxa injection #1 of a 3 part series.  Ultrasound guidance used for proper needle placement.  Procedure was explained in detail prior to starting.  All questions answered to the best of my ability.  Procedure was completed without complication.  See procedure note above.  He will follow-up in 1 week for injection #2.

## 2025-06-30 ENCOUNTER — CLINICAL SUPPORT (OUTPATIENT)
Age: 65
End: 2025-06-30
Payer: COMMERCIAL

## 2025-06-30 DIAGNOSIS — M17.0 BILATERAL PRIMARY OSTEOARTHRITIS OF KNEE: Primary | ICD-10-CM

## 2025-06-30 PROCEDURE — 20610 DRAIN/INJ JOINT/BURSA W/O US: CPT | Performed by: PHYSICIAN ASSISTANT

## 2025-06-30 NOTE — PROGRESS NOTES
Procedure   - Large Joint Arthrocentesis: bilateral knee on 6/30/2025 8:45 AM  Indications: pain  Details: 21 G needle, superolateral approach  Medications (Right): 20 mg Sodium Hyaluronate (Viscosup) 20 MG/2ML  Medications (Left): 20 mg Sodium Hyaluronate (Viscosup) 20 MG/2ML  Outcome: tolerated well, no immediate complications  Procedure, treatment alternatives, risks and benefits explained, specific risks discussed. Consent was given by the patient. Immediately prior to procedure a time out was called to verify the correct patient, procedure, equipment, support staff and site/side marked as required. Patient was prepped and draped in the usual sterile fashion.

## 2025-07-07 ENCOUNTER — CLINICAL SUPPORT (OUTPATIENT)
Age: 65
End: 2025-07-07
Payer: COMMERCIAL

## 2025-07-07 DIAGNOSIS — M17.0 BILATERAL PRIMARY OSTEOARTHRITIS OF KNEE: Primary | ICD-10-CM

## 2025-07-07 RX ORDER — LIDOCAINE HYDROCHLORIDE 10 MG/ML
4 INJECTION, SOLUTION EPIDURAL; INFILTRATION; INTRACAUDAL; PERINEURAL
Status: COMPLETED | OUTPATIENT
Start: 2025-07-07 | End: 2025-07-07

## 2025-07-07 RX ADMIN — LIDOCAINE HYDROCHLORIDE 4 ML: 10 INJECTION, SOLUTION EPIDURAL; INFILTRATION; INTRACAUDAL; PERINEURAL at 11:26

## 2025-07-07 NOTE — PROGRESS NOTES
Procedure   - Large Joint Arthrocentesis: bilateral knee on 7/7/2025 11:26 AM  Indications: pain  Details: 21 G needle, anterolateral approach  Medications (Right): 4 mL lidocaine PF 1% 1 %; 20 mg Sodium Hyaluronate (Viscosup) 20 MG/2ML  Medications (Left): 4 mL lidocaine PF 1% 1 %; 20 mg Sodium Hyaluronate (Viscosup) 20 MG/2ML  Outcome: tolerated well, no immediate complications  Procedure, treatment alternatives, risks and benefits explained, specific risks discussed. Consent was given by the patient. Immediately prior to procedure a time out was called to verify the correct patient, procedure, equipment, support staff and site/side marked as required. Patient was prepped and draped in the usual sterile fashion.        64-year-old male with bilateral knee pain from bilateral knee osteoarthritis.  Here today for ultrasound-guided Euflexxa injection #3 of a 3 part series.  Ultrasound guidance used for proper needle placement.  Procedure was explained in detail prior to starting.  All questions answered to the best of my ability.  Procedure was completed without complication.  See procedure note above.  He will follow-up as needed.

## 2025-08-04 DIAGNOSIS — M17.0 BILATERAL PRIMARY OSTEOARTHRITIS OF KNEE: ICD-10-CM

## 2025-08-04 RX ORDER — CELECOXIB 200 MG/1
CAPSULE ORAL
Qty: 60 CAPSULE | Refills: 1 | Status: SHIPPED | OUTPATIENT
Start: 2025-08-04

## (undated) DEVICE — ADULT, W/LG. BACK PAD, RADIOTRANSPARENT ELEMENT AND LEAD WIRE COMPATIBLE W/: Brand: DEFIBRILLATION ELECTRODES

## (undated) DEVICE — TR BAND RADIAL ARTERY COMPRESSION DEVICE: Brand: TR BAND

## (undated) DEVICE — CATH DIAG EXPO M/ PK 5F FL4/FR4 PIG

## (undated) DEVICE — GW INQWIRE FC PTFE STD J/1.5 .035 260

## (undated) DEVICE — INTRO SHEATH PRELUDE IDEAL SPRNG COIL 021 6F 23X80CM

## (undated) DEVICE — MODEL AT P65, P/N 701554-001KIT CONTENTS: HAND CONTROLLER, 3-WAY HIGH-PRESSURE STOPCOCK WITH ROTATING END AND PREMIUM HIGH-PRESSURE TUBING: Brand: ANGIOTOUCH® KIT

## (undated) DEVICE — PK CATH CARD 10

## (undated) DEVICE — CATH DIAG EXPO .045 FL3  5F 100CM

## (undated) DEVICE — MODEL BT2000 P/N 700287-012KIT CONTENTS: MANIFOLD WITH SALINE AND CONTRAST PORTS, SALINE TUBING WITH SPIKE AND HAND SYRINGE, TRANSDUCER: Brand: BT2000 AUTOMATED MANIFOLD KIT

## (undated) DEVICE — ST INF PRI SMRTSTE 20DRP 2VLV 24ML 117